# Patient Record
Sex: FEMALE | URBAN - METROPOLITAN AREA
[De-identification: names, ages, dates, MRNs, and addresses within clinical notes are randomized per-mention and may not be internally consistent; named-entity substitution may affect disease eponyms.]

---

## 2021-08-23 ENCOUNTER — VIRTUAL VISIT (OUTPATIENT)
Dept: INTERNAL MEDICINE | Facility: CLINIC | Age: 41
End: 2021-08-23
Payer: COMMERCIAL

## 2021-08-23 DIAGNOSIS — Z00.00 ENCOUNTER FOR PREVENTATIVE ADULT HEALTH CARE EXAMINATION: Primary | ICD-10-CM

## 2021-08-23 PROCEDURE — 99207 PR NO CHARGE LOS: CPT

## 2021-08-23 RX ORDER — PROPRANOLOL HCL 60 MG
60 CAPSULE, EXTENDED RELEASE 24HR ORAL
COMMUNITY
Start: 2019-09-27

## 2021-08-23 RX ORDER — SUMATRIPTAN 100 MG/1
100 TABLET, FILM COATED ORAL
COMMUNITY
Start: 2019-09-27

## 2021-08-23 NOTE — PROGRESS NOTES
"Health Maintenance:  Do you have a PCP? Yes  When was your last visit with your PCP? 1 year  When was your last eye exam? 5 months  Have you ever had a colonoscopy? No  If yes, when?   Have you ever had any polyps removed?     If Female: (25 years old+) When was your last pap smear ? 5/2017   Have you ever had abnormal pap smear results? Yes 9 years    (40 years old+) When was your last mammogram? 2020   If last mammogram was more than 1 year ago and patient would like to get  mammogram done as part of their  visit, ask the following questions:   1. Do you have any current or new breast problems? No   2. Where was your last mammogram done?  [facility]   3. Have your had any breast surgeries? No    As part of your visit we will set up a DEXA scan which will measure your body composition. We have a few questions that need to be answered before we can schedule this scan:   What is your approximate weight? 135   Have you ever had a DEXA scan within the past 2 years? No   Will you have any other imaging studies with contrast (x-ray, CT scan) within 7  days of this appointment? No   Have you had any spine or hip surgery? No   Do you take any vitamins that contain calcium or antacids with calcium? No    If yes, stop taking 24 hours prior to visit.     Goals for the Visit:  1. Thorough Comprehensive Preventive Exam  2. Ovarian Cancer in family hx  3. \"Gut Health\"  Pertinent past Medical/Family and Social HX:   Pertinent sx that desire are addressed with this visit:     Instructions prior to appointment:   1. Fast beginning at 10 pm for lab appointment  2. If your preventive care assessment package includes a Fitness Assessment, please bring athletic shoes. Complementary [facility] fitness attire is provided and yours to keep.  3. If eye exam, eyes may be dilated, it will last 4-6 hours, may want to bring sunglasses.   4. May bring laptop or other work materials for use during downtime.   5. You " will receive an email about 3 days prior to your visit with a final itinerary, menu selections for the complementary breakfast and lunch and instructions for the visit.     Complimentary  Parking provided. Drop off car in front of [facility], take the patient elevators to the [facility] clinic. When you enter in the lobby, identify yourself as an [facility] [atient and you will be escorted up to the clinic.   If questions arise prior to your appointment please contact the clinic at [phone].

## 2021-08-26 ENCOUNTER — APPOINTMENT (OUTPATIENT)
Dept: INTERNAL MEDICINE | Facility: CLINIC | Age: 41
End: 2021-08-26
Payer: COMMERCIAL

## 2021-08-26 ENCOUNTER — ANCILLARY PROCEDURE (OUTPATIENT)
Dept: MAMMOGRAPHY | Facility: CLINIC | Age: 41
End: 2021-08-26
Payer: COMMERCIAL

## 2021-08-26 ENCOUNTER — OFFICE VISIT (OUTPATIENT)
Dept: INTERNAL MEDICINE | Facility: CLINIC | Age: 41
End: 2021-08-26
Payer: COMMERCIAL

## 2021-08-26 ENCOUNTER — ANCILLARY PROCEDURE (OUTPATIENT)
Dept: BONE DENSITY | Facility: CLINIC | Age: 41
End: 2021-08-26
Payer: COMMERCIAL

## 2021-08-26 ENCOUNTER — OFFICE VISIT (OUTPATIENT)
Dept: DERMATOLOGY | Facility: CLINIC | Age: 41
End: 2021-08-26
Payer: COMMERCIAL

## 2021-08-26 ENCOUNTER — OFFICE VISIT (OUTPATIENT)
Dept: GASTROENTEROLOGY | Facility: CLINIC | Age: 41
End: 2021-08-26
Payer: COMMERCIAL

## 2021-08-26 ENCOUNTER — OFFICE VISIT (OUTPATIENT)
Dept: AUDIOLOGY | Facility: CLINIC | Age: 41
End: 2021-08-26
Payer: COMMERCIAL

## 2021-08-26 VITALS
SYSTOLIC BLOOD PRESSURE: 105 MMHG | HEIGHT: 66 IN | OXYGEN SATURATION: 99 % | RESPIRATION RATE: 16 BRPM | TEMPERATURE: 97.5 F | WEIGHT: 137 LBS | BODY MASS INDEX: 22.02 KG/M2 | HEART RATE: 58 BPM | DIASTOLIC BLOOD PRESSURE: 71 MMHG

## 2021-08-26 DIAGNOSIS — Z80.41 FAMILY HISTORY OF MALIGNANT NEOPLASM OF OVARY: Primary | ICD-10-CM

## 2021-08-26 DIAGNOSIS — Z12.83 SKIN CANCER SCREENING: ICD-10-CM

## 2021-08-26 DIAGNOSIS — Z00.00 ENCOUNTER FOR PREVENTATIVE ADULT HEALTH CARE EXAMINATION: ICD-10-CM

## 2021-08-26 DIAGNOSIS — D22.9 NEVUS SPILUS: ICD-10-CM

## 2021-08-26 DIAGNOSIS — D22.9 MULTIPLE MELANOCYTIC NEVI: Primary | ICD-10-CM

## 2021-08-26 DIAGNOSIS — Z12.31 VISIT FOR SCREENING MAMMOGRAM: ICD-10-CM

## 2021-08-26 DIAGNOSIS — Z71.82 EXERCISE COUNSELING: Primary | ICD-10-CM

## 2021-08-26 LAB
ALP SERPL-CCNC: 52 U/L (ref 40–150)
ALT SERPL W P-5'-P-CCNC: 21 U/L (ref 0–50)
BASOPHILS # BLD AUTO: 0 10E3/UL (ref 0–0.2)
BASOPHILS NFR BLD AUTO: 0 %
CHOLEST SERPL-MCNC: 193 MG/DL
CREAT SERPL-MCNC: 0.78 MG/DL (ref 0.52–1.04)
DEPRECATED CALCIDIOL+CALCIFEROL SERPL-MC: 27 UG/L (ref 20–75)
EOSINOPHIL # BLD AUTO: 0.1 10E3/UL (ref 0–0.7)
EOSINOPHIL NFR BLD AUTO: 2 %
ERYTHROCYTE [DISTWIDTH] IN BLOOD BY AUTOMATED COUNT: 12.9 % (ref 10–15)
EXPTIME-PRE: 6.49 SEC
FASTING STATUS PATIENT QL REPORTED: YES
FASTING STATUS PATIENT QL REPORTED: YES
FEF2575-%PRED-PRE: 105 %
FEF2575-PRE: 3.37 L/SEC
FEF2575-PRED: 3.19 L/SEC
FEFMAX-%PRED-PRE: 106 %
FEFMAX-PRE: 7.79 L/SEC
FEFMAX-PRED: 7.29 L/SEC
FEV1-%PRED-PRE: 94 %
FEV1-PRE: 2.85 L
FEV1FEV6-PRE: 85 %
FEV1FEV6-PRED: 84 %
FEV1FVC-PRE: 85 %
FEV1FVC-PRED: 83 %
FIFMAX-PRE: 3.99 L/SEC
FVC-%PRED-PRE: 92 %
FVC-PRE: 3.37 L
FVC-PRED: 3.65 L
GFR SERPL CREATININE-BSD FRML MDRD: >90 ML/MIN/1.73M2
GLUCOSE BLD-MCNC: 97 MG/DL (ref 70–99)
HCT VFR BLD AUTO: 39.1 % (ref 35–47)
HDLC SERPL-MCNC: 69 MG/DL
HGB BLD-MCNC: 12.9 G/DL (ref 11.7–15.7)
HIV 1+2 AB+HIV1 P24 AG SERPL QL IA: NONREACTIVE
HOLD SPECIMEN: NORMAL
IMM GRANULOCYTES # BLD: 0 10E3/UL
IMM GRANULOCYTES NFR BLD: 0 %
LDLC SERPL CALC-MCNC: 114 MG/DL
LYMPHOCYTES # BLD AUTO: 1.7 10E3/UL (ref 0.8–5.3)
LYMPHOCYTES NFR BLD AUTO: 25 %
MCH RBC QN AUTO: 29.5 PG (ref 26.5–33)
MCHC RBC AUTO-ENTMCNC: 33 G/DL (ref 31.5–36.5)
MCV RBC AUTO: 89 FL (ref 78–100)
MONOCYTES # BLD AUTO: 0.5 10E3/UL (ref 0–1.3)
MONOCYTES NFR BLD AUTO: 7 %
NEUTROPHILS # BLD AUTO: 4.4 10E3/UL (ref 1.6–8.3)
NEUTROPHILS NFR BLD AUTO: 66 %
NONHDLC SERPL-MCNC: 124 MG/DL
NRBC # BLD AUTO: 0 10E3/UL
NRBC BLD AUTO-RTO: 0 /100
PLATELET # BLD AUTO: 238 10E3/UL (ref 150–450)
RBC # BLD AUTO: 4.38 10E6/UL (ref 3.8–5.2)
TRIGL SERPL-MCNC: 51 MG/DL
TSH SERPL DL<=0.005 MIU/L-ACNC: 1.85 MU/L (ref 0.4–4)
WBC # BLD AUTO: 6.7 10E3/UL (ref 4–11)

## 2021-08-26 PROCEDURE — 84075 ASSAY ALKALINE PHOSPHATASE: CPT | Performed by: PATHOLOGY

## 2021-08-26 PROCEDURE — 93000 ELECTROCARDIOGRAM COMPLETE: CPT | Performed by: INTERNAL MEDICINE

## 2021-08-26 PROCEDURE — 87389 HIV-1 AG W/HIV-1&-2 AB AG IA: CPT | Performed by: INTERNAL MEDICINE

## 2021-08-26 PROCEDURE — 92550 TYMPANOMETRY & REFLEX THRESH: CPT | Performed by: AUDIOLOGIST

## 2021-08-26 PROCEDURE — 84443 ASSAY THYROID STIM HORMONE: CPT | Performed by: PATHOLOGY

## 2021-08-26 PROCEDURE — 92565 STENGER TEST PURE TONE: CPT | Performed by: AUDIOLOGIST

## 2021-08-26 PROCEDURE — 85025 COMPLETE CBC W/AUTO DIFF WBC: CPT | Performed by: PATHOLOGY

## 2021-08-26 PROCEDURE — 99385 PREV VISIT NEW AGE 18-39: CPT | Performed by: INTERNAL MEDICINE

## 2021-08-26 PROCEDURE — 99203 OFFICE O/P NEW LOW 30 MIN: CPT | Performed by: DERMATOLOGY

## 2021-08-26 PROCEDURE — 84460 ALANINE AMINO (ALT) (SGPT): CPT | Performed by: PATHOLOGY

## 2021-08-26 PROCEDURE — 77080 DXA BONE DENSITY AXIAL: CPT | Performed by: INTERNAL MEDICINE

## 2021-08-26 PROCEDURE — 82565 ASSAY OF CREATININE: CPT | Performed by: PATHOLOGY

## 2021-08-26 PROCEDURE — 77067 SCR MAMMO BI INCL CAD: CPT | Mod: GC | Performed by: RADIOLOGY

## 2021-08-26 PROCEDURE — 99207 PR NO CHARGE LOS: CPT

## 2021-08-26 PROCEDURE — 99207 PR NO BILLABLE SERVICE THIS VISIT: CPT | Performed by: DIETITIAN, REGISTERED

## 2021-08-26 PROCEDURE — 80061 LIPID PANEL: CPT | Performed by: PATHOLOGY

## 2021-08-26 PROCEDURE — 36415 COLL VENOUS BLD VENIPUNCTURE: CPT | Performed by: PATHOLOGY

## 2021-08-26 PROCEDURE — 92557 COMPREHENSIVE HEARING TEST: CPT | Performed by: AUDIOLOGIST

## 2021-08-26 PROCEDURE — 82947 ASSAY GLUCOSE BLOOD QUANT: CPT | Performed by: PATHOLOGY

## 2021-08-26 PROCEDURE — 94375 RESPIRATORY FLOW VOLUME LOOP: CPT | Performed by: INTERNAL MEDICINE

## 2021-08-26 PROCEDURE — 77063 BREAST TOMOSYNTHESIS BI: CPT | Mod: GC | Performed by: RADIOLOGY

## 2021-08-26 PROCEDURE — 82306 VITAMIN D 25 HYDROXY: CPT | Performed by: INTERNAL MEDICINE

## 2021-08-26 ASSESSMENT — MIFFLIN-ST. JEOR: SCORE: 1313.18

## 2021-08-26 ASSESSMENT — PAIN SCALES - GENERAL
PAINLEVEL: NO PAIN (0)
PAINLEVEL: NO PAIN (0)

## 2021-08-26 NOTE — LETTER
Date:September 21, 2021      Patient was self referred, no letter generated. Do not send.        [facility] Health Information

## 2021-08-26 NOTE — NURSING NOTE
Dermatology Rooming Note    Bernadine Inman's goals for this visit include:   Chief Complaint   Patient presents with     Derm Problem     bernadine is coming in today for a skin check, states that she has a mole on the stomach that she would like looked at, also states she has a spot on the back that she has been watching     Flaca Villalpando CMA on 8/26/2021 at 1:12 PM

## 2021-08-26 NOTE — PROGRESS NOTES
AUDIOLOGY REPORT    SUMMARY: Audiology visit completed as part of the [facility] Health Assessment. See audiogram for results.      RECOMMENDATIONS: Follow-up with ENT if concerned for episodic left ear symptoms or asymmetry in hearing 4000 Hz (left ear poorer).      Madi Lowry.  Licensed Audiologist  [place] #[id number]

## 2021-08-26 NOTE — LETTER
"Dear Bernadine,    It was a pleasure to meet you in the [facility] program earlier this month!  I am writing to inform you of your test results and recommendations and to provide paper copies for your records.  Most results were available at the time of your office visit, however, I will still summarize everything here.    The two blood test results of note were a borderline high normal fasting glucose, and a low end of normal range Vitamin D level.  A low carb diet, something similar to the Mediterranean Diet, plus increased exercise will help improve glucose levels.  In terms of Vitamin D, I would take a supplement regularly; the standard recommendation is 2,000 international unit(s) daily of D3.    All other labs, including thyroid function, kidney function, electrolytes, and complete blood counts were normal.  This is great news!  And while the LDL cholesterol is flagged as high, the high HDL or \"good\" cholesterol balances this out.  I would continue to pay close attention to diet and exercise and follow it over time.    Your bone density test (DEXA) was in the normal range. Your mammogram was read out as negative.  The pulmonary function tests and cardiogram (EKG) were also normal, indicating good heart and lung function.    Overall, I think you are enjoying excellent health and we should take steps to keep it that way!  I would welcome a return visit in one year to repeat these tests and track things over time.  But please, do not hesitate to reach out to me with any questions or concerns.    Enjoy the rest of your summer!    Sincerely,        Dr. Noa Gardner  General Internal Medicine                        Results for orders placed or performed in visit on 08/26/21   HIV Antigen Antibody Combo [[id number]]     Status: Normal   Result Value Ref Range    HIV Antigen Antibody Combo Nonreactive Nonreactive   Lipid panel reflex to direct LDL Fasting     Status: Abnormal   Result Value Ref Range    " Cholesterol 193 <200 mg/dL    Triglycerides 51 <150 mg/dL    Direct Measure HDL 69 >=50 mg/dL    LDL Cholesterol Calculated 114 (H) <=100 mg/dL    Non HDL Cholesterol 124 <130 mg/dL    Patient Fasting > 8hrs? Yes    Vitamin D Deficiency     Status: Normal   Result Value Ref Range    Vitamin D, Total (25-Hydroxy) 27 20 - 75 ug/L    Narrative    Season, race, dietary intake, and treatment affect the concentration of 25-hydroxy-Vitamin D. Values may decrease during winter months and increase during summer months. Values 20-29 ug/L may indicate Vitamin D insufficiency and values <20 ug/L may indicate Vitamin D deficiency.    Vitamin D determination is routinely performed by an immunoassay specific for 25 hydroxyvitamin D3.  If an individual is on vitamin D2(ergocalciferol) supplementation, please specify 25 OH vitamin D2 and D3 level determination by LCMSMS test VITD23.     TSH     Status: Normal   Result Value Ref Range    TSH 1.85 0.40 - 4.00 mU/L   Glucose     Status: None   Result Value Ref Range    Glucose 97 70 - 99 mg/dL    Patient Fasting > 8hrs? Yes    Creatinine     Status: Normal   Result Value Ref Range    Creatinine 0.78 0.52 - 1.04 mg/dL    GFR Estimate >90 >60 mL/min/1.73m2   Alkaline phosphatase     Status: Normal   Result Value Ref Range    Alkaline Phosphatase 52 40 - 150 U/L   ALT     Status: Normal   Result Value Ref Range    ALT 21 0 - 50 U/L   CBC with platelets differential     Status: None    Narrative    The following orders were created for panel order CBC with platelets differential.  Procedure                               Abnormality         Status                     ---------                               -----------         ------                     CBC with platelets and d...[[id number]]                      Final result                 Please view results for these tests on the individual orders.   CBC with platelets and differential     Status: None   Result Value Ref Range    WBC  Count 6.7 4.0 - 11.0 10e3/uL    RBC Count 4.38 3.80 - 5.20 10e6/uL    Hemoglobin 12.9 11.7 - 15.7 g/dL    Hematocrit 39.1 35.0 - 47.0 %    MCV 89 78 - 100 fL    MCH 29.5 26.5 - 33.0 pg    MCHC 33.0 31.5 - 36.5 g/dL    RDW 12.9 10.0 - 15.0 %    Platelet Count 238 150 - 450 10e3/uL    % Neutrophils 66 %    % Lymphocytes 25 %    % Monocytes 7 %    % Eosinophils 2 %    % Basophils 0 %    % Immature Granulocytes 0 %    NRBCs per 100 WBC 0 <1 /100    Absolute Neutrophils 4.4 1.6 - 8.3 10e3/uL    Absolute Lymphocytes 1.7 0.8 - 5.3 10e3/uL    Absolute Monocytes 0.5 0.0 - 1.3 10e3/uL    Absolute Eosinophils 0.1 0.0 - 0.7 10e3/uL    Absolute Basophils 0.0 0.0 - 0.2 10e3/uL    Absolute Immature Granulocytes 0.0 <=0.0 10e3/uL    Absolute NRBCs 0.0 10e3/uL   Extra Blue Top Tube     Status: None   Result Value Ref Range    Hold Specimen JIC    Extra Tube     Status: None    Narrative    The following orders were created for panel order Extra Tube.  Procedure                               Abnormality         Status                     ---------                               -----------         ------                     Extra Blue Top Tube[[id number]]                              Final result                 Please view results for these tests on the individual orders.   Results for orders placed or performed in visit on 08/26/21   General PFT Lab (Please always keep checked)     Status: None   Result Value Ref Range    FVC-Pred 3.65 L    FVC-Pre 3.37 L    FVC-%Pred-Pre 92 %    FEV1-Pre 2.85 L    FEV1-%Pred-Pre 94 %    FEV1FVC-Pred 83 %    FEV1FVC-Pre 85 %    FEFMax-Pred 7.29 L/sec    FEFMax-Pre 7.79 L/sec    FEFMax-%Pred-Pre 106 %    FEF2575-Pred 3.19 L/sec    FEF2575-Pre 3.37 L/sec    RJG8473-%Pred-Pre 105 %    ExpTime-Pre 6.49 sec    FIFMax-Pre 3.99 L/sec    FEV1FEV6-Pred 84 %    FEV1FEV6-Pre 85 %    Narrative    The FVC, FEV1, FEV1/FVC ratio and SIJ44-16% are within normal limits.  The inspiratory flow rates are reduced but the  expiratory flow rates are within normal limits.        IMPRESSION:    Normal Spirometry.              This interpretation has been electronically signed:  JUSTIN CHAUDHRY 08/26/2021  04:46:36 PM     Results for orders placed or performed in visit on 08/26/21   MA Screen Bilateral w/Chuck     Status: None    Narrative    BILATERAL FULL FIELD DIGITAL SCREENING MAMMOGRAM WITH TOMOSYNTHESIS    Performed on: 8/26/21    Compared to: 09/30/2020, 08/19/2019, and 04/05/2018    Technique:  This study was evaluated with the assistance of Computer-Aided   Detection.  Breast Tomosynthesis was used in interpretation.    Findings: The breasts are heterogeneously dense, which may obscure small   masses.  There is no radiographic evidence of malignancy.     IMPRESSION: ACR BI-RADS Category 1: Negative    RECOMMENDED FOLLOW-UP: Annual routine screening mammogram    The results and recommendations of this examination will be communicated   to the patient.   Results for orders placed or performed in visit on 08/26/21   DX Hip/Pelvis/Spine     Status: None    Narrative    [address] [address]  Phone: [phone]   Fax: [phone]      Patient name:   Bernadine Inman  Patient demographics:  40 year old Female   History:  Evaluation of Bone Density, History of Fracture and Tobacco   Habit - Current, [facility]  Current treatments:  No Medications  Scan:    Forever      Conclusions:  The most negative and valid Z-score of -1.1 at the level of the lumbar   spine is WITHIN normal range according to WHO criteria for premenopausal   females and men age less than 50.    Low bone density is not the only risk factor for fracture; consider   factors such as patient's age, fall risk, injury risk, previous   osteoporotic fracture, family history of osteoporosis, etc.  People with   an elevated risk of fracture should be regularly evaluated for  low bone   mineral density.  For patients eligible for Medicare, routine testing is   allowed once every 2 years. Testing frequency can be increased for   patients on corticosteroids. Clinical correlation is recommended.     Please refer to BMD values in PACS.    Bone densitometry cannot rule out secondary causes of bone loss.   Therefore, further metabolic testing to look for secondary causes of low   BMD should be performed if indicated. Clinical correlation is recommended      Feel free to contact DXA services if you have any questions or comments.    Thank you for the opportunity to be of service to you and your patient.    Principal result :  Cindy Mays MD, [facility]  Division of Endocrinology and Diabetes    Department of Medicine    Technical comments:   Satisfactory.     References:  Ref. 1. WHO categories:          T-score > -1.0  = normal             .                                T-score -1.0 to -2.5 = low bone density  T-score < -2.5 = osteoporosis        .     Ref. 2. 2015 ISCD official position statements:  www.iscd.org.     Ref. 3.  (LSC = least significant change)  AP spine =  0.032 g/cm2  (6.26.2007)  Left hip = 0.029 g/cm2  (6.15.2007)  Right hip = 0.018 g/cm2  (6.15.2007)  Left mid radius = 0.043 g/cm2  (6.26.2007)  Lateral spine region = pending  Total body = pending  According to the ISCD position statements, total hip rather than femoral   neck regions are to be compared because larger areas give better   precision.     Ref. 4.  By definition, osteoporosis may be diagnosed in the presence or   with the history of a low trauma or fragility fracture.  Fragility and low   trauma fracture is defined as a fracture resulting from the force of a   fall from a standing height or less or a bone that breaks under conditions   that would not cause a normal bone to break.       Ref. 5. NOF Physician's Guideline Website address:  www.nof.org.   Results for orders placed or performed in  visit on 08/26/21   EKG 12-lead, tracing only     Status: None   Result Value Ref Range    Systolic Blood Pressure  mmHg    Diastolic Blood Pressure  mmHg    Ventricular Rate 56 BPM    Atrial Rate 56 BPM    MD Interval 152 ms    QRS Duration 82 ms     ms    QTc 397 ms    P Axis -5 degrees    R AXIS 32 degrees    T Axis 43 degrees    Interpretation ECG       Sinus bradycardia with sinus arrhythmia  Otherwise normal ECG  No previous ECGs available  Confirmed by Sarah Jauregui ([id number]) on 8/28/2021 3:58:03 PM

## 2021-08-26 NOTE — LETTER
8/26/2021       RE: Bernadine Inman  [address]  [place] [facility] [address]     Dear Colleague,    Thank you for referring your patient, Bernadine Inman, to the [facility]ER[facility] [place] at [facility]. Please see a copy of my visit note below.    CHIEF COMPLAINT:  [facility] skin cancer screening exam.    HISTORY OF PRESENT ILLNESS:  Bernadine is a very pleasant 40-year-old female who is a new patient to our clinic today, presenting as part of a [facility] preventive visit here for a skin cancer screening exam.  Previously, she has seen Dr. Blanquita Meeks at [facility] and had 1 biopsy of a pigmented lesion, which was reported as benign.  Otherwise, Dr. Meeks has been following several moles over time, but she has not had any additional biopsies.      Today, Bernadine reports that all her moles appear stable and she denies any localized areas of itch, pain or bleeding.  She reports that several family members have had what she believes were nonmelanoma skin cancers, but she has no family history of melanoma.    REVIEW OF SYSTEMS:  No recent fevers.  No nonhealing oral sores.    PHYSICAL EXAMINATION:    GENERAL:  This is a well-appearing, well-nourished female with normal mood and affect, who is oriented x3.  SKIN:  A cutaneous exam of the head, neck, chest, abdomen, back, bilateral upper and lower extremities and buttocks was performed in the presence of a female chaperone.  Scattered on the back, there are 2- to 4 mm light tan to brown macules and flat-topped papules, all of which demonstrate regular features on gross exam and dermoscopy.  On the midline upper back, there is an approximately 3 cm light tan speckled patch with a hypopigmented halo which most resembles a nevus spilus.  On the abdomen, there are a few 2- to 3 mm light tan, well-marginated papules consistent with benign nevi.  On the dorsal forearms,  face and neck, there is evidence of mild chronic photodamage.  The rest of her cutaneous exam is unremarkable.    ASSESSMENT AND PLAN:    1.  Benign skin cancer screening exam as part of a [facility] visit.  Bernadine was reassured today about her overall benign findings on exam.  We had an extended discussion about the ABCDEs of melanoma as well as the signs and symptoms of nonmelanoma skin cancers.  We also discussed our recommendations regarding sun protective behaviors including high SPF sunscreens and sun protective clothing.  2.  Multiple benign appearing nevi of the trunk and mid-back.  Reassurance was provided as to the benign nature of these lesions.  3.  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  Reassurance was similarly provided.  4.  She will follow up in our clinic on an as-needed basis.      Jean Tavarez MD  Dermatology Attending            Again, thank you for allowing me to participate in the care of your patient.      Sincerely,    Jean Tavarez MD

## 2021-08-26 NOTE — LETTER
"8/26/2021      RE: Bernadine Inman  [address]  [facility] [address]       Bernadine La is a very pleasant 40 year old female who was seen today for annual physical and executive health assessment.      She is overall enjoying good health, but does endorse a couple of concerns.  First, she has a strong family history of cancer, including breast and ovarian cancer; we discussed a referral [facility] for a repeat [facility].  She had genetic counseling at [facility] 6 years ago, however, more family history is evident since then and I am fairly certain more extensive gene panels are available now compared to then.    We spent some time discussing migraines, which are well controlled on propranolol, occurring maybe 1-2 times a year.  She's had intermittent knee pain which responds to physical therapy.  We also discussed gut health, and a long history of what sounds like possible irritable bowel syndrome, diarrhea predominant. She knows how to manage with dietary changes, however I mentioned if her symptoms become more bothersome, we could consider a dietitian referral or a consult with Dr. Milady Baez in Gastroenterology.    Her health goals for the near future include eating a more plant based diet and getting more consistent exercise.  She enjoys working from home but feels overall less active.  She lives in [place] working as an .    Otherwise, no changes to past, family or social history per intake note dated 8/23 and  ROS: 10 point ROS neg other than the symptoms noted above in the HPI.    PHYSICAL EXAM:  /71   Pulse 58   Temp 97.5  F (36.4  C) (Oral)   Resp 16   Ht 1.676 m (5' 6\")   Wt 62.1 kg (137 lb)   LMP 07/24/2021   SpO2 99%   BMI 22.11 kg/m    Constitutional: no distress, comfortable, pleasant   Eyes: anicteric, normal extra-ocular movements   Ears, Nose and Throat: neck supple with full range of motion, no thyromegaly. "   Cardiovascular: regular rate and rhythm, normal S1 and S2, no murmurs, rubs or gallops, peripheral pulses full and symmetric   Respiratory: clear to auscultation, no wheezes or crackles, normal breath sounds   Gastrointestinal: positive bowel sounds, nontender, no hepatosplenomegaly, no masses   Musculoskeletal: knees full range of motion, no effusion or joint space narrowing  Skin: no concerning lesions, no jaundice   Neurological: normal gait, no tremor   Psychological: appropriate mood   Lymphatic: no cervical lymphadenopathy and no pedal edema        A/P  40 year old here for PE and executive health assessment, doing well.  Please see this encounter's lab result letter for a more detailed explanation of test results and recommendations.  Plan to refer to genetic counseling for cancer risk given family history.    Family history of malignant neoplasm of ovary  -     Cancer Risk Mgmt/Cancer Genetic Counseling Referral; Future    Encounter for preventative adult health care examination  -     HIV Antigen Antibody Combo [[id number]]  -     Lipid panel reflex to direct LDL Fasting  -     Vitamin D Deficiency  -     TSH  -     Glucose  -     Creatinine  -     Alkaline phosphatase  -     ALT  -     CBC with platelets differential    Other orders  -     Extra Tube    RTC in one year, or sooner prn    MD Kendra Anaya MD

## 2021-08-26 NOTE — PATIENT INSTRUCTIONS
It was nice meeting you today. Below are the nutrition recommendations we discussed at your visit. If you wanted to have a follow up visit outside of your annual executive health visits to follow up on the low FODMAP diet/re-introduction/personalizing phase, I included my gastroenterology scheduling number below.  I'm currently doing video visits through that clinic.    Please let me know if you have any additional questions.    Nutrition Recommendations    1. Can follow a full low FODMAP diet or modified/partial low FODMAP diet for 3-4 weeks.  - Start with eliminating higher FODMAP foods for 3-4 weeks.   -Then after 3-4 weeks start adding those higher FODMAP foods back into diet. (see below).    OR   If you prefer to do a partial/modified lower FODMAP diet, then    -For Modified/partial lower FODMAP diet, review the list of high FODMAP foods and then eliminate several of those higher FODMAP foods that you eat most days of the week for 3-4 weeks.    After 3-4 weeks, to reintroduce and add higher FODMAP foods back into diet:    -Start by adding back in 1 of your favorite higher FODMAP foods by eating a small serving of that food each day for 3-4 days OR you can gradually increase the portion size over the course of the 3-4 days. For example on day 1 have 1/4 cup serving, day 2, 1/3 c and then on day 3 can have a 1/2 cup serving.    -If tolerated, then add another higher FODMAP food back into diet for 3-4 days and onward.     -You can add in a food every 3-4 days or could add one food each week (depending on how quickly you'd like to re-introduce and also depending on if you have a return of symptoms that may linger for a day or so).    -If you notice any symptoms after adding back in a higher FODMAP food than can wait a couple of days before trying the next higher FODMAP food.     -Also if you notice a significant increase in symptoms after retrying the first day, you do not need to eat on additional days    2. Keep  daily food and beverage journals and track all symptoms you experience. Can use the FODMAP sander on [facility]'s site or other apps include My Symptoms Tracker or [employer] sander.    3. Plan menus and meals ahead of time to help you stick to the elimination diet.    4. Continue following a more plant-based diet if this is better tolerated.    5. Consistently drink at least 60 oz fluids or more such as water, herbal tea per day.    If you would like to schedule a follow up appointment, please call [phone].    Alize Hoyt, MS, RD, LD

## 2021-08-26 NOTE — NURSING NOTE
Chief Complaint   Patient presents with     Physical     Patient is here for annual physical     Katheryn Perez CMA 6:55 AM on 8/26/2021.

## 2021-08-26 NOTE — LETTER
Date:November 19, 2021      Patient was self referred, no letter generated. Do not send.        [facility] Health Information

## 2021-08-26 NOTE — PROGRESS NOTES
"[facility] Outpatient Medical Nutrition Therapy      Time Spent:  60 minutes  Session Type:  Initial  Referral Source: [facility] [facility] Package/Dr. Noa Gardner  Reason for RD Visit:   Nutritional counseling     Nutrition Assessment:  Patient is here for initial annual visit with Registered Dietitian (LUIS MIGUEL).  Patient is a 40 y.o. female. She stated that she follows an intermittent diet and also more of a plant based diet. She very occasionally has some fish. She eats eggs but not daily and will have some cheese occasionally as well. She stated that she has a long history of having issues with diarrhea/looser stools which is why she switched to trying intermittent fasting and more plant-based diet. She doesn't drinking any caffeine. She will have some alcohol on the weekends. Before making some dietary changes, she noticed that alcohol may have been a trigger for looser stools but improved with dietary changes. She has had a busy month, so feels that she has had some more symptoms this month but previously had some improvement and less looser stools before this month with dietary changes.      At discussion today, she may like to try a modified low FODMAP diet, so provided instructions today.    Patient Active Problem List   Diagnosis     Left hip pain     Cervicalgia     Chondromalacia of patella, right     Pain in thoracic spine     Lumbago     Bilateral hip pain     Disorder of bursae and tendons in shoulder region       Estimated body mass index is 22.11 kg/m  as calculated from the following:    Height as of an earlier encounter on 8/26/21: 1.676 m (5' 6\").    Weight as of an earlier encounter on 8/26/21: 62.1 kg (137 lb).    Diet Recall:  (some usual/recent meals/snacks/beverages)  Meal Food    Breakfast Skips d/t intermittent fasting   Lunch Tofu, veg wrap or salad or homemade smoothie with plant based pro, greens, banana, frozen berries   Dinner Pasta and broccoli with marinara sauce or smoked " fish with rayo and lon slaw with vegan sauce    Snacks 3pm: tortilla chips. Sometimes a snack after dinner if still hungry: popcorn, smoothie   Beverages 40-60 oz water, 1-2 c herbal teas, 1 can sparkling water per day   Alcohol Intake On 1-2 weekend nights, 3-4 drinks each night when having alcohol such as glasses wine, sometimes beer, very occas if out cocktail      Labs:  On 8/26/21: . Others reviewed in EMR.  Pertinent Medications/vitamin and mineral supplements:     Current Outpatient Medications   Medication     propranolol ER (INDERAL LA) 60 MG 24 hr capsule     SUMAtriptan (IMITREX) 100 MG tablet     No current facility-administered medications for this visit.     Food Allergies:  NKFA    Nutrition Diagnosis:    Food and nutrition related knowledge deficit related to lack of previous diet education/lack of complete recall of previous diet education as evidenced by pt report and interest in diet education/review.     Nutrition Prescription: trial modified low FODMAP diet x 3-4 week, continue more plant based diet    Nutrition Intervention:    Nutrition Education/Counseling:  -Provided general healthful diet nutrition education with tips and suggestions. Discussed general healthful diet using plate method. Discussed some potential foods and beverage contributing to diarrhea/loose stools.   Nutrition Education: Provided nutrition education on general digestion of carbohydrates, FODMAP (Fermentable Oligo-saccharides, Di-saccharides, Mono-saccharides And Polyols) foods, impact these short chain carbohydrates on GI tract, Gut microbiota and its effects on fiber. Discussed high and low FODMAP foods. Reviewed FODMAP diet guidelines, including length of the diet,  the different phases of the diet plan with elimination phase and discussed recommendation and schedule for adding foods back in. Explained that pt could follow a modified lower FODMAP diet if preferred. For Modified lower FODMAP diet, review the  list of high FODMAP foods and then eliminate 1-2 of those high FODMAP foods you eat daily/most days to see if any improvement in GI issues. (i.e. onions).    Keep daily food and beverage journals and track all GI symptoms.    Discussed adequate hydration and recommended pt continue drinking at least 48 oz-64 oz fluids/water per day. Provided diet education handouts for Low FODMAP. Patient expressed understanding of diet education and interested in focusing on highlighted goals below.    Answered patient's questions. Patient verbalized understanding of education provided. Provided pt with RD contact information and list of goals below.     Educational Materials Provided:  [employer] Daily Nutrition Plate method handout and Nutrition Care Manual: Low FODMAP nutrition therapy and FODMAP food chart     Goals:  1. Can follow a full low FODMAP diet or modified/partial low FODMAP diet for 3-4 weeks.  - Start with eliminating higher FODMAP foods for 3-4 weeks.   -Then after 3-4 weeks start adding those higher FODMAP foods back into diet. (see below).    OR   If you prefer to do a partial/modified lower FODMAP diet, then    -For Modified/partial lower FODMAP diet, review the list of high FODMAP foods and then eliminate several of those higher FODMAP foods that you eat most days of the week for 3-4 weeks.    After 3-4 weeks, to reintroduce and add higher FODMAP foods back into diet:    -Start by adding back in 1 of your favorite higher FODMAP foods by eating a small serving of that food each day for 3-4 days OR you can gradually increase the portion size over the course of the 3-4 days. For example on day 1 have 1/4 cup serving, day 2, 1/3 c and then on day 3 can have a 1/2 cup serving.    -If tolerated, then add another higher FODMAP food back into diet for 3-4 days and onward.     -You can add in a food every 3-4 days or could add one food each week (depending on how quickly you'd like to re-introduce and also depending on if  you have a return of symptoms that may linger for a day or so).    -If you notice any symptoms after adding back in a higher FODMAP food than can wait a couple of days before trying the next higher FODMAP food.     -Also if you notice a significant increase in symptoms after retrying the first day, you do not need to eat on additional days    2. Keep daily food and beverage journals and track all symptoms you experience. Can use the FODMAP sander on [facility]'s site or other apps include My Symptoms Tracker or [employer] sander.    3. Plan menus and meals ahead of time to help you stick to the elimination diet.    4. Continue following a more plant-based diet if this is better tolerated.    5. Consistently drink at least 60 oz fluids or more such as water, herbal tea per day.    Nutrition Monitoring and Evaluation: Will monitor adherence to nutrition recommendations at future RD visits.     Further Medical Nutrition Therapy:  Annual visits  Patient was encouraged to call/contact RD with any further questions.    Alize Hoyt, MS, RD, LD

## 2021-08-26 NOTE — LETTER
"8/26/2021       RE: Bernadine Inman  [address]  [place] [address]     Dear Colleague,    Thank you for referring your patient, Bernadine Inman, to the [facility] [place] [facility]. Please see a copy of my visit note below.    Bernadine La is a very pleasant 40 year old female who was seen today for annual physical and executive health assessment.      She is overall enjoying good health, but does endorse a couple of concerns.  First, she has a strong family history of cancer, including breast and ovarian cancer; we discussed a referral to our genetic counselors here at the [facility] for a repeat assessment.  She had genetic counseling at [facility] 6 years ago, however, more family history is evident since then and I am fairly certain more extensive gene panels are available now compared to then.    We spent some time discussing migraines, which are well controlled on propranolol, occurring maybe 1-2 times a year.  She's had intermittent knee pain which responds to physical therapy.  We also discussed gut health, and a long history of what sounds like possible irritable bowel syndrome, diarrhea predominant. She knows how to manage with dietary changes, however I mentioned if her symptoms become more bothersome, we could consider a dietitian referral or a consult with Dr. Milady Baez in Gastroenterology.    Her health goals for the near future include eating a more plant based diet and getting more consistent exercise.  She enjoys working from home but feels overall less active.  She lives in [place] working as an .    Otherwise, no changes to past, family or social history per intake note dated 8/23 and  ROS: 10 point ROS neg other than the symptoms noted above in the HPI.    PHYSICAL EXAM:  /71   Pulse 58   Temp 97.5  F (36.4  C) (Oral)   Resp 16   Ht 1.676 m (5' 6\")   Wt 62.1 " kg (137 lb)   LMP 07/24/2021   SpO2 99%   BMI 22.11 kg/m    Constitutional: no distress, comfortable, pleasant   Eyes: anicteric, normal extra-ocular movements   Ears, Nose and Throat: neck supple with full range of motion, no thyromegaly.   Cardiovascular: regular rate and rhythm, normal S1 and S2, no murmurs, rubs or gallops, peripheral pulses full and symmetric   Respiratory: clear to auscultation, no wheezes or crackles, normal breath sounds   Gastrointestinal: positive bowel sounds, nontender, no hepatosplenomegaly, no masses   Musculoskeletal: knees full range of motion, no effusion or joint space narrowing  Skin: no concerning lesions, no jaundice   Neurological: normal gait, no tremor   Psychological: appropriate mood   Lymphatic: no cervical lymphadenopathy and no pedal edema        A/P  40 year old here for PE and executive health assessment, doing well.  Please see this encounter's lab result letter for a more detailed explanation of test results and recommendations.  Plan to refer to genetic counseling for cancer risk given family history.    Family history of malignant neoplasm of ovary  -     Cancer Risk Mgmt/Cancer Genetic Counseling Referral; Future    Encounter for preventative adult health care examination  -     HIV Antigen Antibody Combo [[id number]]  -     Lipid panel reflex to direct LDL Fasting  -     Vitamin D Deficiency  -     TSH  -     Glucose  -     Creatinine  -     Alkaline phosphatase  -     ALT  -     CBC with platelets differential    Other orders  -     Extra Tube    RTC in one year, or sooner prn    Noa Gardner MD          Again, thank you for allowing me to participate in the care of your patient.      Sincerely,    Kendra Turner MD

## 2021-08-26 NOTE — LETTER
"    8/26/2021         RE: Bernadine Inman  [address]  [place] [facility] [address]        Dear Colleague,    Thank you for referring your patient, Bernadine Inman, to the [facility] [place]. Please see a copy of my visit note below.    [facility] Outpatient Medical Nutrition Therapy      Time Spent:  60 minutes  Session Type:  Initial  Referral Source: [employer] Package/Dr. Noa Gardner  Reason for RD Visit:   Nutritional counseling     Nutrition Assessment:  Patient is here for initial annual visit with Registered Dietitian (LUIS MIGUEL).  Patient is a 40 y.o. female. She stated that she follows an intermittent diet and also more of a plant based diet. She very occasionally has some fish. She eats eggs but not daily and will have some cheese occasionally as well. She stated that she has a long history of having issues with diarrhea/looser stools which is why she switched to trying intermittent fasting and more plant-based diet. She doesn't drinking any caffeine. She will have some alcohol on the weekends. Before making some dietary changes, she noticed that alcohol may have been a trigger for looser stools but improved with dietary changes. She has had a busy month, so feels that she has had some more symptoms this month but previously had some improvement and less looser stools before this month with dietary changes.      At discussion today, she may like to try a modified low FODMAP diet, so provided instructions today.    Patient Active Problem List   Diagnosis     Left hip pain     Cervicalgia     Chondromalacia of patella, right     Pain in thoracic spine     Lumbago     Bilateral hip pain     Disorder of bursae and tendons in shoulder region       Estimated body mass index is 22.11 kg/m  as calculated from the following:    Height as of an earlier encounter on 8/26/21: 1.676 m (5' 6\").    Weight as of an earlier encounter on 8/26/21: 62.1 kg " (137 lb).    Diet Recall:  (some usual/recent meals/snacks/beverages)  Meal Food    Breakfast Skips d/t intermittent fasting   Lunch Tofu, veg wrap or salad or homemade smoothie with plant based pro, greens, banana, frozen berries   Dinner Pasta and broccoli with marinara sauce or smoked fish with rayo and lon slaw with vegan sauce    Snacks 3pm: tortilla chips. Sometimes a snack after dinner if still hungry: popcorn, smoothie   Beverages 40-60 oz water, 1-2 c herbal teas, 1 can sparkling water per day   Alcohol Intake On 1-2 weekend nights, 3-4 drinks each night when having alcohol such as glasses wine, sometimes beer, very occas if out cocktail      Labs:  On 8/26/21: . Others reviewed in EMR.  Pertinent Medications/vitamin and mineral supplements:     Current Outpatient Medications   Medication     propranolol ER (INDERAL LA) 60 MG 24 hr capsule     SUMAtriptan (IMITREX) 100 MG tablet     No current facility-administered medications for this visit.     Food Allergies:  NKFA    Nutrition Diagnosis:    Food and nutrition related knowledge deficit related to lack of previous diet education/lack of complete recall of previous diet education as evidenced by pt report and interest in diet education/review.     Nutrition Prescription: trial modified low FODMAP diet x 3-4 week, continue more plant based diet    Nutrition Intervention:    Nutrition Education/Counseling:  -Provided general healthful diet nutrition education with tips and suggestions. Discussed general healthful diet using plate method. Discussed some potential foods and beverage contributing to diarrhea/loose stools.   Nutrition Education: Provided nutrition education on general digestion of carbohydrates, FODMAP (Fermentable Oligo-saccharides, Di-saccharides, Mono-saccharides And Polyols) foods, impact these short chain carbohydrates on GI tract, Gut microbiota and its effects on fiber. Discussed high and low FODMAP foods. Reviewed FODMAP diet  guidelines, including length of the diet,  the different phases of the diet plan with elimination phase and discussed recommendation and schedule for adding foods back in. Explained that pt could follow a modified lower FODMAP diet if preferred. For Modified lower FODMAP diet, review the list of high FODMAP foods and then eliminate 1-2 of those high FODMAP foods you eat daily/most days to see if any improvement in GI issues. (i.e. onions).    Keep daily food and beverage journals and track all GI symptoms.    Discussed adequate hydration and recommended pt continue drinking at least 48 oz-64 oz fluids/water per day. Provided diet education handouts for Low FODMAP. Patient expressed understanding of diet education and interested in focusing on highlighted goals below.    Answered patient's questions. Patient verbalized understanding of education provided. Provided pt with RD contact information and list of goals below.     Educational Materials Provided:  [employer] Daily Nutrition Plate method handout and Nutrition Care Manual: Low FODMAP nutrition therapy and FODMAP food chart     Goals:  1. Can follow a full low FODMAP diet or modified/partial low FODMAP diet for 3-4 weeks.  - Start with eliminating higher FODMAP foods for 3-4 weeks.   -Then after 3-4 weeks start adding those higher FODMAP foods back into diet. (see below).    OR   If you prefer to do a partial/modified lower FODMAP diet, then    -For Modified/partial lower FODMAP diet, review the list of high FODMAP foods and then eliminate several of those higher FODMAP foods that you eat most days of the week for 3-4 weeks.    After 3-4 weeks, to reintroduce and add higher FODMAP foods back into diet:    -Start by adding back in 1 of your favorite higher FODMAP foods by eating a small serving of that food each day for 3-4 days OR you can gradually increase the portion size over the course of the 3-4 days. For example on day 1 have 1/4 cup serving, day 2, 1/3 c  and then on day 3 can have a 1/2 cup serving.    -If tolerated, then add another higher FODMAP food back into diet for 3-4 days and onward.     -You can add in a food every 3-4 days or could add one food each week (depending on how quickly you'd like to re-introduce and also depending on if you have a return of symptoms that may linger for a day or so).    -If you notice any symptoms after adding back in a higher FODMAP food than can wait a couple of days before trying the next higher FODMAP food.     -Also if you notice a significant increase in symptoms after retrying the first day, you do not need to eat on additional days    2. Keep daily food and beverage journals and track all symptoms you experience. Can use the FODMAP sander on [facility]'s site or other apps include My Symptoms Tracker or [employer] sander.    3. Plan menus and meals ahead of time to help you stick to the elimination diet.    4. Continue following a more plant-based diet if this is better tolerated.    5. Consistently drink at least 60 oz fluids or more such as water, herbal tea per day.    Nutrition Monitoring and Evaluation: Will monitor adherence to nutrition recommendations at future RD visits.     Further Medical Nutrition Therapy:  Annual visits  Patient was encouraged to call/contact RD with any further questions.    Alize Hoyt, MS, RD, LD        Again, thank you for allowing me to participate in the care of your patient.      Sincerely,    Alize Hoyt RD

## 2021-08-26 NOTE — NURSING NOTE
AHA BP    1st   103/69  2nd  107/70  3rd   105/71    Average   104/70  Katheryn Perez CMA 8:20 AM on 8/26/2021

## 2021-08-26 NOTE — PROGRESS NOTES
Bernadine Inman comes into clinic today at the request of Dr. CHICHO Peoples Ordering Provider for EKG.    This service provided today was under the supervising provider of the day Dr. CHICHO Peoples, who was available if needed.    Katheryn Perez, [facility]

## 2021-08-27 NOTE — OUTPATIENT NURSE NOTE
08/27/21 1236   Fitness   Current Fitness Regimen:  Occasional physical activity (peloton bike) about 2-3 times per week.   Fitness Goals Increase frequency of physical activity 5 times per week and improve cardiorespiratory fitness.   Timeline   Recommended Activity this Week 3 times per week   Recommended Minutes per Day this Week 30 Min   Recommended Number of Days this Week 3 Per Day/Per Week   Recommended Activity this Month 4 times per week   Recommended Duration this Month 45 Min/Hrs   Recommended Frequency this Month 4 Per Day/Per Week   Recommended Activity the Nex 3 Months Increase frequency of riding peloton to 5 time per week.   Recommended Duration the Nex 3 Months 60 Min/Hrs   Recommended Frequency the Nex 3 Months:  5 Per Day/Per Week   VO2 Max   VO2MAX:  35 ml/kg/min   VO2- max Percentile 40 %   Fitness Level Fair    Strength    Strength (Right):  67.7 ml/kg/min    Strength (Left) 67.9 ml/kg/min

## 2021-08-27 NOTE — PATIENT INSTRUCTIONS
It was a pleasure to meet you Mrs. Inman,    You cardiorespiratory fitness measured using VO2 max was of 35 mL/kg/min, which places you at the 40th percentile. Your ventilatory threshold (VT) occurred at a VO2 of 26.3 mL/kg/min, at a relative intensity of 75% of her VO2 max. Heart rate max was of 160 bpm and heart rate at VT was between 115-120 bpm. VT is a parameters that says the intensity at which you have been training for the last several months. A completely sedentary individual has a VT at 50-55% of their VO2 max. This means your levels of training has been moderate to vigorous.     If you are interested in improving your VO2 max, you should increase the intensity of your exercise sessions to a heart rate of 120 bpm or above and increase the frequency to 4-5 times per week. Your performance on the VO2 max trest will depend on the type of training that you are doing. If you train more on a treadmill instead of the peloton bike, your next VO2 max test will better resemble your true cardiorespiratory fitness.     Overall, you are a very healthy individual and I encourage that you at least keep up to the level of training that you have until now.     Looking forward to seeing you next time to measure your improvements.    Best regards    Shahram Howell PhD  Exercise Physiologist

## 2021-08-28 LAB
ATRIAL RATE - MUSE: 56 BPM
DIASTOLIC BLOOD PRESSURE - MUSE: NORMAL MMHG
INTERPRETATION ECG - MUSE: NORMAL
P AXIS - MUSE: -5 DEGREES
PR INTERVAL - MUSE: 152 MS
QRS DURATION - MUSE: 82 MS
QT - MUSE: 412 MS
QTC - MUSE: 397 MS
R AXIS - MUSE: 32 DEGREES
SYSTOLIC BLOOD PRESSURE - MUSE: NORMAL MMHG
T AXIS - MUSE: 43 DEGREES
VENTRICULAR RATE- MUSE: 56 BPM

## 2021-08-31 NOTE — PROGRESS NOTES
"Bernadine La is a very pleasant 40 year old female who was seen today for annual physical and executive health assessment.      She is overall enjoying good health, but does endorse a couple of concerns.  First, she has a strong family history of cancer, including breast and ovarian cancer; we discussed a referral to [facility] for a repeat [facility].  She had genetic counseling at [facility] 6 years ago, however, more family history is evident since then and I am fairly certain more extensive gene panels are available now compared to then.    We spent some time discussing migraines, which are well controlled on propranolol, occurring maybe 1-2 times a year.  She's had intermittent knee pain which responds to physical therapy.  We also discussed gut health, and a long history of what sounds like possible irritable bowel syndrome, diarrhea predominant. She knows how to manage with dietary changes, however I mentioned if her symptoms become more bothersome, we could consider a dietitian referral or a consult with Dr. Milady Baez in Gastroenterology.    Her health goals for the near future include eating a more plant based diet and getting more consistent exercise.  She enjoys working from home but feels overall less active.  She lives in [place] working as an .    Otherwise, no changes to past, family or social history per intake note dated 8/23 and  ROS: 10 point ROS neg other than the symptoms noted above in the HPI.    PHYSICAL EXAM:  /71   Pulse 58   Temp 97.5  F (36.4  C) (Oral)   Resp 16   Ht 1.676 m (5' 6\")   Wt 62.1 kg (137 lb)   LMP 07/24/2021   SpO2 99%   BMI 22.11 kg/m    Constitutional: no distress, comfortable, pleasant   Eyes: anicteric, normal extra-ocular movements   Ears, Nose and Throat: neck supple with full range of motion, no thyromegaly.   Cardiovascular: regular rate and rhythm, normal S1 and S2, no murmurs, rubs or gallops, " peripheral pulses full and symmetric   Respiratory: clear to auscultation, no wheezes or crackles, normal breath sounds   Gastrointestinal: positive bowel sounds, nontender, no hepatosplenomegaly, no masses   Musculoskeletal: knees full range of motion, no effusion or joint space narrowing  Skin: no concerning lesions, no jaundice   Neurological: normal gait, no tremor   Psychological: appropriate mood   Lymphatic: no cervical lymphadenopathy and no pedal edema        A/P  40 year old here for PE and executive health assessment, doing well.  Please see this encounter's lab result letter for a more detailed explanation of test results and recommendations.  Plan to refer to genetic counseling for cancer risk given family history.    Family history of malignant neoplasm of ovary  -     Cancer Risk Mgmt/Cancer Genetic Counseling Referral; Future    Encounter for preventative adult health care examination  -     HIV Antigen Antibody Combo [[id number]]  -     Lipid panel reflex to direct LDL Fasting  -     Vitamin D Deficiency  -     TSH  -     Glucose  -     Creatinine  -     Alkaline phosphatase  -     ALT  -     CBC with platelets differential    Other orders  -     Extra Tube    RTC in one year, or sooner satya Gardner MD

## 2021-09-08 NOTE — PROGRESS NOTES
Action    Action Taken 9/8/2021 4:13pm MARQUES     I called [facility] to request the following   -- 8/16/13 [facility] Comprehensive  Ph: [phone] - they will fax over the report.     I called pt Bernadine- her phone went to .

## 2021-09-13 ENCOUNTER — PRE VISIT (OUTPATIENT)
Dept: ONCOLOGY | Facility: CLINIC | Age: 41
End: 2021-09-13

## 2021-09-13 ENCOUNTER — VIRTUAL VISIT (OUTPATIENT)
Dept: ONCOLOGY | Facility: CLINIC | Age: 41
End: 2021-09-13
Attending: INTERNAL MEDICINE
Payer: COMMERCIAL

## 2021-09-13 DIAGNOSIS — Z80.3 FAMILY HISTORY OF MALIGNANT NEOPLASM OF BREAST: ICD-10-CM

## 2021-09-13 DIAGNOSIS — Z80.42 FAMILY HISTORY OF MALIGNANT NEOPLASM OF PROSTATE: ICD-10-CM

## 2021-09-13 DIAGNOSIS — Z80.41 FAMILY HISTORY OF MALIGNANT NEOPLASM OF OVARY: Primary | ICD-10-CM

## 2021-09-13 DIAGNOSIS — Z80.51 FAMILY HISTORY OF MALIGNANT NEOPLASM OF KIDNEY: ICD-10-CM

## 2021-09-13 PROCEDURE — 96040 HC GENETIC COUNSELING, EACH 30 MINUTES: CPT | Mod: GT,95 | Performed by: GENETIC COUNSELOR, MS

## 2021-09-13 NOTE — PROGRESS NOTES
2021    Referring Provider: Noa Gardner MD    Presenting Information:   I met with Bernadine La for her video genetic counseling visit, through the Cancer Risk Management Program, to discuss her family history of ovarian, kidney, breast, and prostate cancer. Today we reviewed this history, cancer screening recommendations, and available genetic testing options.    Personal History:  Bernadine La is a 40 year old year old female. She does not have any personal history of cancer. She had her first menstrual period at age 11, her first child at age 32, and is premenopausal. Bernadine La has her ovaries, fallopian tubes and uterus in place, and she has had no ovarian cancer screening to date. She reports that she has not used hormone replacement therapy. She has annual clinical breast exams and mammograms; her most recent mammogram in 2021 was normal. Bernadine La has not had a colonoscopy. She does not regularly do any other cancer screening at this time.     Family History: (Please see scanned pedigree for detailed family history information)    Bernadine's sister was diagnosed with ovarian cancer at age 29. Given her age of diagnosis, this was most like a germ cell ovarian tumor.    A maternal uncle was diagnosed with kidney cancer in his 60's.    Bernadine's maternal grandfather was diagnosed with both prostate cancer and kidney cancer in his 60's and  in his mid-70's.    A paternal aunt is reported to have been diagnosed with breast cancer three times in her 50's. It is unknown how many of these were recurrences vs new primary breast cancers.    A paternal aunt was diagnosed with ovarian cancer in her mid-60's.    A paternal uncle and Bernadine's paternal grandfather were both diagnosed with lung cancer at unknown ages.    Bernadine's paternal grandmother  from multiple myeloma at an unknown age.    Her maternal and paternal ethnicity is Hebrew. There is no known Ashkenazi Mormon ancestry on either side of  her family. There is no reported consanguinity.    Discussion:    Bernadine La's family history of ovarian, breast, kidney, and prostate cancer is suggestive of a hereditary cancer syndrome.    We reviewed the features of sporadic, familial, and hereditary cancers. We discussed that mutations in either BRCA1 or BRCA2 could be possible hereditary explanations for her family history of cancer. Mutations in the BRCA1 or BRCA2 gene are known to cause Hereditary Breast and Ovarian Cancer Syndrome (HBOC). HBOC typically presents with multiple family members diagnosed with breast cancer before age 50 and/or ovarian cancer. Other cancer risks associated with HBOC include male breast cancer, prostate cancer, pancreatic cancer, and melanoma.     We discussed the natural history and genetics of hereditary cancer. A detailed handout regarding hereditary cancer, along with the other information we discussed, will be mailed to Bernadine La at the end of our appointment today and can be found in the after visit summary. Topics included: inheritance pattern, cancer risks, cancer screening recommendations, and also risks, benefits and limitations of testing.    Based on her personal and family history, Bernadine La meets current National Comprehensive Cancer Network (NCCN) criteria for genetic testing of BRCA1/2 along with other high-penetrance breast and/or ovarian cancer susceptibility genes (I.e. CDH1, PALB2, PTEN, and TP53).    We discussed that there are additional genes that could cause increased risk for breast, ovarian, prostate, and kidney cancer. As many of these genes present with overlapping features in a family and accurate cancer risk cannot always be established based upon the pedigree analysis alone, it would be reasonable for Bernadine La to consider panel genetic testing to analyze multiple genes at once.  Genetic testing is available for 58 genes associated with hereditary cancer: Custom Cancers panel (APC, RAMBO, AXIN2,  BAP1, BARD1, BMPR1A, BRCA1, BRCA2, BRIP1, CDC73, CDH1, CDK4, CDKN2A, CDKN1C, CHEK2, CTNNA1, DICER1, DIS3L2, EPCAM, FH, FLCN, GREM1, GPC3, HOXB13, KIT, MEN1, MET, MLH1, MSH2, MSH3, MSH6, MUTYH, NBN, NF1, NTHL1, PALB2, PDGFRA, PMS2, POLD1, POLE, PTEN, RAD50, RAD51C, RAD51D, REST, SDHA, SDHB, SDHC, SDHD, SMAD4, SMARCA4, SMARCB1, STK11, TP53, TSC1, TSC2, VHL, and WT1).  We discussed that many of the genes in the Custom Cancers panel are associated with specific hereditary cancer syndromes and published management guidelines: Hereditary Breast and Ovarian Cancer syndrome (BRCA1, BRCA2), Castillo syndrome (MLH1, MSH2, MSH6, PMS2, EPCAM), Familial Adenomatous Polyposis (APC), Hereditary Diffuse Gastric Cancer (CDH1), Familial Atypical Multiple Mole Melanoma syndrome (CDK4, CDKN2A), Juvenile Polyposis syndrome (BMPR1A, SMAD4), Cowden syndrome (PTEN), Li Fraumeni syndrome (TP53), Peutz-Jeghers syndrome (STK11), MUTYH Associated Polyposis (MUTYH), Hereditary Leiomyomatosis and Renal Cell Cancer (FH), Dkcr-Njsu-Iqcb (FLCN), Hereditary Papillary Renal Carcinoma (MET), Tuberous Sclerosis complex (TSC1, TSC2), Neurofibromatosis type 1 (NF1), Multiple Endocrine Neoplasia type 1 (MEN1), Hereditary Paraganglioma and Pheochromocytoma (SDHA, SDHB, SDHC, SDHD), and von Hippel-Lindau (VHL).   The RAMBO, AXIN2, BRIP1, CHEK2, GREM1, MSH3, NBN, NTHL1, PALB2, POLD1, POLE, RAD51C, and RAD51D genes are associated with increased cancer risk and have published management guidelines for certain cancers.    The remaining genes (BAP1, BARD1, CDC73, CDKN1C, CTNNA1, DICER1, DIS3L2, GPC3, HOXB13, KIT, PDGFRA, RAD50, REST, SMARCA4, SMARCB1, WT1) are associated with increased cancer risk and may allow us to make medical recommendations when mutations are identified.      Bernadine La stated that she would prefer to submit a saliva kit for her genetic testing. [facility] will send a kit directly to her home with directions on how to collect a saliva sample. We  discussed that there is a small chance for sample failure due to contamination of the sample. To help minimize this, she should follow the directions that are sent with the kit. Bernadine La verbalized understanding of this. Once the sample is collected, she will send it to [employer] using the return envelope and prepaid shipping label.     Verbal consent was given over video and written on the consent form due to COVID-19 restrictions. Turnaround time is approximately 4 weeks once the lab receives the sample.    Medical Management: For Bernadine La, we reviewed that the information from genetic testing may determine:    additional cancer screening for which Bernadine aL may qualify (i.e. mammogram and breast MRI, more frequent colonoscopies, more frequent dermatologic exams, etc.),    options for risk reducing surgeries Bernadine La could consider (i.e. bilateral mastectomy, surgery to remove her ovaries and/or uterus, etc.),      and targeted chemotherapies for Bernadine La if she were to develop certain cancers in the future (i.e. immunotherapy for individuals with Castillo syndrome, PARP inhibitors, etc.).     These recommendations and possible targeted chemotherapies will be discussed in detail once genetic testing is completed.     Plan:  1) Today Bernadine La elected to proceed with a Custom panel through [facility].  2) A copy of the consent form and the after visit summary will be mailed to Bernadine La.  3) This information should be available in approximately 4 weeks, once the lab receives the sample.  4) I will call Bernadine La with the results once they become available.    Time spent on video: 49 minutes    Jean-Paul Hess MS, [facility] [place] [facility]  Licensed, Certified Genetic Counselor  (P): [phone]  (F): [phone]

## 2021-09-13 NOTE — LETTER
Cancer Risk Management  Program Locations    [facility]  [facility]  [facility]  [facility]  Mailing Address  Cancer Risk Management Program  [address]  [place] [address]    New patient appointments  [phone]  September 13, 2021    Bernadine Inman  [address]  [facility] [address]      Dear Bernadine La,    It was a pleasure speaking with you over video on 9/13/2021. Here is a copy of the progress note from our discussion. If you have any additional questions, please feel free to call.    Referring Provider: Noa Gardner MD    Presenting Information:   I met with Bernadine La for her video genetic counseling visit, through the Cancer Risk Management Program, to discuss her family history of ovarian, kidney, breast, and prostate cancer. Today we reviewed this history, cancer screening recommendations, and available genetic testing options.    Personal History:  Bernadine La is a 40 year old year old female. She does not have any personal history of cancer. She had her first menstrual period at age 11, her first child at age 32, and is premenopausal. Bernadine La has her ovaries, fallopian tubes and uterus in place, and she has had no ovarian cancer screening to date. She reports that she has not used hormone replacement therapy. She has annual clinical breast exams and mammograms; her most recent mammogram in August 2021 was normal. Bernadine La has not had a colonoscopy. She does not regularly do any other cancer screening at this time.     Family History: (Please see scanned pedigree for detailed family history information)    Bernadine's sister was diagnosed with ovarian cancer at age 29. Given her age of diagnosis, this was most like a germ cell ovarian tumor.    A maternal uncle was diagnosed with kidney  cancer in his 60's.    Bernadine's maternal grandfather was diagnosed with both prostate cancer and kidney cancer in his 60's and  in his mid-70's.    A paternal aunt is reported to have been diagnosed with breast cancer three times in her 50's. It is unknown how many of these were recurrences vs new primary breast cancers.    A paternal aunt was diagnosed with ovarian cancer in her mid-60's.    A paternal uncle and Bernadine's paternal grandfather were both diagnosed with lung cancer at unknown ages.    Bernadine's paternal grandmother  from multiple myeloma at an unknown age.    Her maternal and paternal ethnicity is Upper sorbian. There is no known Ashkenazi Buddhist ancestry on either side of her family. There is no reported consanguinity.    Discussion:    Bernadine La's family history of ovarian, breast, kidney, and prostate cancer is suggestive of a hereditary cancer syndrome.    We reviewed the features of sporadic, familial, and hereditary cancers. We discussed that mutations in either BRCA1 or BRCA2 could be possible hereditary explanations for her family history of cancer. Mutations in the BRCA1 or BRCA2 gene are known to cause Hereditary Breast and Ovarian Cancer Syndrome (HBOC). HBOC typically presents with multiple family members diagnosed with breast cancer before age 50 and/or ovarian cancer. Other cancer risks associated with HBOC include male breast cancer, prostate cancer, pancreatic cancer, and melanoma.     We discussed the natural history and genetics of hereditary cancer. A detailed handout regarding hereditary cancer, along with the other information we discussed, will be mailed to Bernadine La at the end of our appointment today and can be found in the after visit summary. Topics included: inheritance pattern, cancer risks, cancer screening recommendations, and also risks, benefits and limitations of testing.    Based on her personal and family history, Bernadine La meets current National Comprehensive Cancer  Network (NCCN) criteria for genetic testing of BRCA1/2 along with other high-penetrance breast and/or ovarian cancer susceptibility genes (I.e. CDH1, PALB2, PTEN, and TP53).    We discussed that there are additional genes that could cause increased risk for breast, ovarian, prostate, and kidney cancer. As many of these genes present with overlapping features in a family and accurate cancer risk cannot always be established based upon the pedigree analysis alone, it would be reasonable for Bernadine La to consider panel genetic testing to analyze multiple genes at once.  Genetic testing is available for 58 genes associated with hereditary cancer: Custom Cancers panel (APC, RAMBO, AXIN2, BAP1, BARD1, BMPR1A, BRCA1, BRCA2, BRIP1, CDC73, CDH1, CDK4, CDKN2A, CDKN1C, CHEK2, CTNNA1, DICER1, DIS3L2, EPCAM, FH, FLCN, GREM1, GPC3, HOXB13, KIT, MEN1, MET, MLH1, MSH2, MSH3, MSH6, MUTYH, NBN, NF1, NTHL1, PALB2, PDGFRA, PMS2, POLD1, POLE, PTEN, RAD50, RAD51C, RAD51D, REST, SDHA, SDHB, SDHC, SDHD, SMAD4, SMARCA4, SMARCB1, STK11, TP53, TSC1, TSC2, VHL, and WT1).  We discussed that many of the genes in the Custom Cancers panel are associated with specific hereditary cancer syndromes and published management guidelines: Hereditary Breast and Ovarian Cancer syndrome (BRCA1, BRCA2), Castillo syndrome (MLH1, MSH2, MSH6, PMS2, EPCAM), Familial Adenomatous Polyposis (APC), Hereditary Diffuse Gastric Cancer (CDH1), Familial Atypical Multiple Mole Melanoma syndrome (CDK4, CDKN2A), Juvenile Polyposis syndrome (BMPR1A, SMAD4), Cowden syndrome (PTEN), Li Fraumeni syndrome (TP53), Peutz-Jeghers syndrome (STK11), MUTYH Associated Polyposis (MUTYH), Hereditary Leiomyomatosis and Renal Cell Cancer (FH), Nklf-Qczx-Irxz (FLCN), Hereditary Papillary Renal Carcinoma (MET), Tuberous Sclerosis complex (TSC1, TSC2), Neurofibromatosis type 1 (NF1), Multiple Endocrine Neoplasia type 1 (MEN1), Hereditary Paraganglioma and Pheochromocytoma (SDHA, SDHB, SDHC, SDHD),  and von Hippel-Lindau (VHL).   The RAMBO, AXIN2, BRIP1, CHEK2, GREM1, MSH3, NBN, NTHL1, PALB2, POLD1, POLE, RAD51C, and RAD51D genes are associated with increased cancer risk and have published management guidelines for certain cancers.    The remaining genes (BAP1, BARD1, CDC73, CDKN1C, CTNNA1, DICER1, DIS3L2, GPC3, HOXB13, KIT, PDGFRA, RAD50, REST, SMARCA4, SMARCB1, WT1) are associated with increased cancer risk and may allow us to make medical recommendations when mutations are identified.      Bernadine La stated that she would prefer to submit a saliva kit for her genetic testing. [employer] will send a kit directly to her home with directions on how to collect a saliva sample. We discussed that there is a small chance for sample failure due to contamination of the sample. To help minimize this, she should follow the directions that are sent with the kit. Bernadine La verbalized understanding of this. Once the sample is collected, she will send it to [employer] using the return envelope and prepaid shipping label.     Verbal consent was given over video and written on the consent form due to COVID-19 restrictions. Turnaround time is approximately 4 weeks once the lab receives the sample.    Medical Management: For Bernadine La, we reviewed that the information from genetic testing may determine:    additional cancer screening for which Bernadine La may qualify (i.e. mammogram and breast MRI, more frequent colonoscopies, more frequent dermatologic exams, etc.),    options for risk reducing surgeries Bernadine La could consider (i.e. bilateral mastectomy, surgery to remove her ovaries and/or uterus, etc.),      and targeted chemotherapies for Bernadine La if she were to develop certain cancers in the future (i.e. immunotherapy for individuals with Castillo syndrome, PARP inhibitors, etc.).     These recommendations and possible targeted chemotherapies will be discussed in detail once genetic testing is completed.     Plan:  1) Today  Bernadine La elected to proceed with a Custom panel through [facility].  2) A copy of the consent form and the after visit summary will be mailed to Bernadine La.  3) This information should be available in approximately 4 weeks, once the lab receives the sample.  4) I will call Bernadine La with the results once they become available.    Time spent on video: 49 minutes    Jean-Paul Hess MS, [facility] [place]  Licensed, Certified Genetic Counselor  (P): [phone]  (F): [phone]

## 2021-09-18 PROBLEM — D22.9 NEVUS SPILUS: Status: ACTIVE | Noted: 2021-09-18

## 2021-09-18 PROBLEM — D22.9 MULTIPLE MELANOCYTIC NEVI: Status: ACTIVE | Noted: 2021-09-18

## 2021-09-18 PROBLEM — Z12.83 SKIN CANCER SCREENING: Status: ACTIVE | Noted: 2021-09-18

## 2021-10-19 NOTE — PATIENT INSTRUCTIONS
Assessing Cancer Risk  Only about 5-10% of cancers are thought to be due to an inherited cancer susceptibility gene.    These families often have:    Several people with the same or related types of cancer    Cancers diagnosed at a young age (before age 50)    Individuals with more than one primary cancer    Multiple generations of the family affected with cancer    Some people may be candidates for genetic testing of more than one gene.  For these families, genetic testing using a cancer panel may be offered.  These panels will test different genes known to increase the risk for breast, ovarian, uterine, and/or other cancers. All of the genes discussed below have published clinical management guidelines for individuals who are found to carry a mutation. The purpose of this handout is to serve as a brief summary of the genes analyzed by the panels used to inquire about hereditary breast and gynecologic cancer:  RAMBO, BRCA1, BRCA2, BRIP1, CDH1, CHEK2, MLH1, MSH2, MSH6, PMS2, EPCAM, PTEN, PALB2, RAD51C, RAD51D, and TP53.  ______________________________________________________________________________  Hereditary Breast and Ovarian Cancer Syndrome   (BRCA1 and BRCA2)  A single mutation in one of the copies of BRCA1 or BRCA2 increases the risk for breast and ovarian cancer, among others.  The risk for pancreatic cancer and melanoma may also be slightly increased in some families.  The chart below shows the chance that someone with a BRCA mutation would develop cancer in his or her lifetime1,2,3,4.        A person s ethnic background is also important to consider, as individuals of Ashkenazi Mor[place] ancestry have a higher chance of having a BRCA gene mutation.  There are three BRCA mutations that occur more frequently in this population.    Castillo Syndrome   (MLH1, MSH2, MSH6, PMS2, and EPCAM)  Currently five genes are known to cause Castillo Syndrome: MLH1, MSH2, MSH6, PMS2, and EPCAM.  A single mutation in one of the  Castillo Syndrome genes increases the risk for colon, endometrial, ovarian, and stomach cancers.  Other cancers that occur less commonly in Castillo Syndrome include urinary tract, skin, and brain cancers.  The chart below shows the chance that a person with Castillo syndrome would develop cancer in his or her lifetime5.      *Cancer risk varies depending on Castillo syndrome gene found    Cowden Syndrome   (PTEN)  Cowden syndrome is a hereditary condition that increases the risk for breast, thyroid, endometrial, colon, and kidney cancer.  Cowden syndrome is caused by a mutation in the PTEN gene.  A single mutation in one of the copies of PTEN causes Cowden syndrome and increases cancer risk.  The chart below shows the chance that someone with a PTEN mutation would develop cancer in their lifetime6,7.  Other benign features seen in some individuals with Cowden syndrome include benign skin lesions (facial papules, keratoses, lipomas), learning disability, autism, thyroid nodules, colon polyps, and larger head size.      *One recent study found breast cancer risk to be increased to 85%    Li-Fraumeni Syndrome   (TP53)  Li-Fraumeni Syndrome (LFS) is a cancer predisposition syndrome caused by a mutation in the TP53 gene. A single mutation in one of the copies of TP53 increases the risk for multiple cancers. Individuals with LFS are at an increased risk for developing cancer at a young age. The lifetime risk for development of a LFS-associated cancer is 50% by age 30 and 90% by age 60.   Core Cancers: Sarcomas, Breast, Brain, Lung, Leukemias/Lymphomas, Adrenocortical carcinomas  Other Cancers: Gastrointestinal, Thyroid, Skin, Genitourinary    Hereditary Diffuse Gastric Cancer   (CDH1)  Currently, one gene is known to cause hereditary diffuse gastric cancer (HDGC): CDH1.  Individuals with HDGC are at increased risk for diffuse gastric cancer and lobular breast cancer. Of people diagnosed with HDGC, 30-50% have a mutation in the CDH1  gene.  This suggests there are likely other genes that may cause HDGC that have not been identified yet.      Lifetime Cancer Risks    General Population HDGC    Diffuse Gastric  <1% ~80%   Breast 12% 39-52%         Additional Genes  RAMBO  RAMBO is a moderate-risk breast cancer gene. Women who have a mutation in RAMBO can have between a 2-4 fold increased risk for breast cancer compared to the general population8. RAMBO mutations have also been associated with increased risk for pancreatic cancer, however an estimate of this cancer risk is not well understood9. Individuals who inherit two RAMBO mutations have a condition called ataxia-telangiectasia (AT).  This rare autosomal recessive condition affects the nervous system and immune system, and is associated with progressive cerebellar ataxia beginning in childhood.  Individuals with ataxia-telangiectasia often have a weakened immune system and have an increased risk for childhood cancers.    PALB2  Mutations in PALB2 have been shown to increase the risk of breast cancer up to 33-58% in some families; where individuals fall within this risk range is dependent upon family [id number]. PALB2 mutations have also been associated with increased risk for pancreatic cancer, although this risk has not been quantified yet.  Individuals who inherit two PALB2 mutations--one from their mother and one from their father--have a condition called Fanconi Anemia.  This rare autosomal recessive condition is associated with short stature, developmental delay, bone marrow failure, and increased risk for childhood cancers.    CHEK2   CHEK2 is a moderate-risk breast cancer gene.  Women who have a mutation in CHEK2 have around a 2-fold increased risk for breast cancer compared to the general population, and this risk may be higher depending upon family history.11,12,13 Mutations in CHEK2 have also been shown to increase the risk of a number of other cancers, including colon and prostate, however  these cancer risks are currently not well understood.    BRIP1, RAD51C and RAD51D  Mutations in BRIP1, RAD51C, and RAD51D have been shown to increase the risk of ovarian cancer and possibly female breast cancer as well14,15 .       Lifetime Cancer Risk    General Population BRIP1 RAD51C RAD51D   Ovarian 1-2% ~5-8% ~5-9% ~7-15%           Inheritance  All of the cancer syndromes reviewed above are inherited in an autosomal dominant pattern.  This means that if a parent has a mutation, each of his or her children will have a 50% chance of inheriting that same mutation.  Therefore, each child--male or female--would have a 50% chance of being at increased risk for developing cancer.      Image obtained from Genetics Home Reference, 2013     Mutations in some genes can occur de trent, which means that a person s mutation occurred for the first time in them and was not inherited from a parent.  Now that they have the mutation, however, it can be passed on to future generations.    Genetic Testing  Genetic testing involves a blood test and will look at the genetic information in the RAMBO, BRCA1, BRCA2, BRIP1, CDH1, CHEK2, MLH1, MSH2, MSH6, PMS2, EPCAM, PTEN, PALB2, RAD51C, RAD51D, and TP53 genes for any harmful mutations that are associated with increased cancer risk.  If possible, it is recommended that the person(s) who has had cancer be tested before other family members.  That person will give us the most useful information about whether or not a specific gene is associated with the cancer in the family.    Results  There are three possible results of genetic testing:    Positive--a harmful mutation was identified in one or more of the genes    Negative--no mutation was identified in any of the genes on this panel    Variant of unknown significance--a variation in one of the genes was identified, but it is unclear how this impacts cancer risk in the family    Advantages and Disadvantages   There are advantages and  disadvantages to genetic testing.    Advantages    May clarify your cancer risk    Can help you make medical decisions    May explain the cancers in your family    May give useful information to your family members (if you share your results)    Disadvantages    Possible negative emotional impact of learning about inherited cancer risk    Uncertainty in interpreting a negative test result in some situations    Possible genetic discrimination concerns (see below)    Genetic Information Nondiscrimination Act (MARVEL)  MARVEL is a federal law that protects individuals from health insurance or employment discrimination based on a genetic test result alone.  Although rare, there are currently no legal discrimination protections in terms of life insurance, long term care, or disability insurances.  Visit the [employer] [place] website to learn more.    Reducing Cancer Risk  All of the genes described above have nationally recognized cancer screening guidelines that would be recommended for individuals who test positive.  In addition to increased cancer screening, surgeries may be offered or recommended to reduce cancer risk.  Recommendations are based upon an individual s genetic test result as well as their personal and family history of cancer.    Questions to Think About Regarding Genetic Testing:    What effect will the test result have on me and my relationship with my family members if I have an inherited gene mutation?  If I don t have a gene mutation?    Should I share my test results, and how will my family react to this news, which may also affect them?    Are my children ready to learn new information that may one day affect their own health?    Hereditary Cancer Resources    FORCE: Facing Our Risk of Cancer Empowered facingourrisk.org   Bright Pink bebrightpink.org   Li-Fraumeni Syndrome Association lfsassociation.org   PTEN World PTENworld.com   No stomach for cancer, Inc.  nostomachforcancer.org   Stomach cancer relief network Scrnet.org   Collaborative Group of the Americas on Inherited Colorectal Cancer (CGA) cgaicc.com    Cancer Care cancercare.org   American Cancer Society (ACS) cancer.org   National Cancer [place] (NCI) cancer.gov     Please call us if you have any questions or concerns.   Cancer Risk Management Program [phone] ([phone])  ? Jean-Paul Hess, MS, [facility] [phone]  ? Beverley Petersen, MS, [facility]  [phone]  ? Jill Krishnan, MS, [facility]  [phone]  ? Zoe Dylon, MS, [facility] [phone]  ? Mirna Khadijah, MS, [facility] [phone]  ? Alma Romario, MS, [facility]  [phone]  ? Janessa Bethea, MS  [phone]    References  1. Jonnie STYLES, Braeden PDP, Laura S, Jacque LIGHT, Kimberlyn JE, Vasquez JL, Jairo N, Toni H, Law O, Klever A, Jt B, Radishanta P, Manibeth S, Fede DM, Marlow N, Ruby E, Katie H, Hector E, Noreen J, Gronjd J, Tiff B, Keyonna H, Thorlacius S, Eerola H, Nevpriscana H, Lila K, Jennifer OP. Average risks of breast and ovarian cancer associated with BRCA1 or BRCA2 mutations detected in case series unselected for family history: a combined analysis of 222 studies. Am J Hum Sumaya. 2003;72:1117-30.  2. Hilario N, Lorraine M, Carla G.  BRCA1 and BRCA2 Hereditary Breast and Ovarian Cancer. Gene Reviews online. 2013.  3. Xu YC, Amanda S, Guera G, Carver S. Breast cancer risk among male BRCA1 and BRCA2 mutation carriers. J Natl Cancer Inst. 2007;99:1811-4.  4. Khari VU, Melissa I, Leighton J, Ilia E, Eugene ER, Bertha F. Risk of breast cancer in male BRCA2 carriers. J Med Sumaya. 2010;47:710-1.  5. National Comprehensive Cancer Network. Clinical practice guidelines in oncology, colorectal cancer screening. Available online (registration required). 2015.  6. Yobani BEAR, Patti J, Lalitha J, Cristiane LA, Cassie ORTEGA, Eng C. Lifetime cancer risks in individuals with germline PTEN mutations. Clin Cancer Res. 2012;18:400-7.  7. Pilarski R. Cowden  Syndrome: A Critical Review of the Clinical Literature. J Sumaya . 2009:18:13-27.  8. Libia A, Johnny D, Prema S, Geovanna P, Schuyler T, Nicole M, Rodger B, Alma H, Mikaela R, Alecia K, Huber L, Khari DG, Fede D, Luis DF, Jeny MR, The Breast Cancer Susceptibility Collaboration (UK) & Kasey GLOVER. RAMBO mutations that cause ataxia-telangiectasia are breast cancer susceptibility alleles. Nature Genetics. 2006;38:873-875  9. Jg N , Vick Y, Coni J, Jacob L, Jovan GM , Davon ML, Gallinger S, Conway AG, Syngal S, Nichol ML, Anai J , Maggie R, Ankit SZ, Robinson JR, Eduardo VE, Nolan M, Vokin B, Cyn N, Camryn RH, Earnestine KW, and Alma AP. RAMBO mutations in patients with hereditary pancreatic cancer. Cancer Discover. 2012;2:41-46  10. Jonnie INGRAM, et al. Breast-Cancer Risk in Families with Mutations in PALB2. NEJM. 2014; 371(6):497-506.  11. CHEK2 Breast Cancer Case-Control Consortium. CHEK2*1100delC and susceptibility to breast cancer: A collaborative analysis involving 10,860 breast cancer cases and 9,065 controls from 10 studies. Am J Hum Sumaya, 74 (2004), pp. 1524-4084  12. Kash T, Celia S, Jose Carlos K, et al. Spectrum of Mutations in BRCA1, BRCA2, CHEK2, and TP53 in Families at High Risk of Breast Cancer. ANIKET. 2006;295(12):7940-6998.   13. Phyllis C, Mariam D, Minal A, et al. Risk of breast cancer in women with a CHEK2 mutation with and without a family history of breast cancer. J Clin Oncol. 2011;29:9480-3941.  14. Satnam H, Isaura E, Jace SJ, et al. Contribution of germline mutations in the RAD51B, RAD51C, and RAD51D genes to ovarian cancer in the population. J Clin Oncol. 2015;33(26):7647-8985. Doi:10.1200/JCO.2015.61.2408.  15. Kevon T, Robby DORANTES, Jason P, et al. Mutations in BRIP1 confer high risk of ovarian cancer. Rabia Sumaya. 2011;43(11):5600-3774. doi:10.1038/ng.955.

## 2021-11-02 ENCOUNTER — TELEPHONE (OUTPATIENT)
Dept: ONCOLOGY | Facility: CLINIC | Age: 41
End: 2021-11-02

## 2021-11-02 NOTE — TELEPHONE ENCOUNTER
11/2/21 - [facility] to call [phone] opt5, opt2 to schedule return virtual visit with Jean-Paul Hess ANYTIME for genetic testing results.-Joon

## 2021-11-05 ENCOUNTER — VIRTUAL VISIT (OUTPATIENT)
Dept: ONCOLOGY | Facility: CLINIC | Age: 41
End: 2021-11-05
Attending: GENETIC COUNSELOR, MS
Payer: COMMERCIAL

## 2021-11-05 DIAGNOSIS — Z80.3 FAMILY HISTORY OF MALIGNANT NEOPLASM OF BREAST: ICD-10-CM

## 2021-11-05 DIAGNOSIS — Z80.42 FAMILY HISTORY OF MALIGNANT NEOPLASM OF PROSTATE: ICD-10-CM

## 2021-11-05 DIAGNOSIS — Z15.03 MUTATION IN HOXB13 GENE: ICD-10-CM

## 2021-11-05 DIAGNOSIS — Z80.51 FAMILY HISTORY OF MALIGNANT NEOPLASM OF KIDNEY: ICD-10-CM

## 2021-11-05 DIAGNOSIS — Z80.41 FAMILY HISTORY OF MALIGNANT NEOPLASM OF OVARY: Primary | ICD-10-CM

## 2021-11-05 PROCEDURE — 999N000069 HC STATISTIC GENETIC COUNSELING, < 16 MIN: Mod: GT,95 | Performed by: GENETIC COUNSELOR, MS

## 2021-11-05 NOTE — Clinical Note
"Hello,    Please enclose a copy of the test report from the laboratory tab titled \"laboratory miscellaneous order\" dated 10/4/21 (Order [id number]) to send to the patient along with the letter.    Referring provider: please see summary of genetic test results below.    Thank you,  Jean-Paul"

## 2021-11-05 NOTE — LETTER
Cancer Risk Management  Program Locations    [facility]  [facility]  [facility]  Mailing Address  Cancer Risk Management Program  [address]  [place] [address]    New patient appointments  [phone]  November 5, 2021    Bernadine Inman  [address]  [facility] [address]    Dear Bernadine La,    It was a pleasure speaking with you over video on 11/5/2021. Here is a copy of the progress note from our discussion. If you have any additional questions, please feel free to call.    Referring Provider: Noa Gardner MD    Presenting Information:   I spoke to Bernadine La by video today to discuss her genetic testing results. A saliva kit was sent to her house on 9/14/21. A custom panel was ordered from [facility]. This testing was done because of Bernadine La's family history of breast, ovarian, prostate, and kidney cancer.     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 We discussed that this mutation is associated with an increased risk for prostate cancer. We discussed the impact of this testing on Bernadine La in detail.     Of note, Bernadine La is negative for mutations in APC, RAMBO, AXIN2, BAP1, BARD1, BMPR1A, BRCA1, BRCA2, BRIP1, CDC73, CDH1, CDK4, CDKN2A, CDKN1C, CHEK2, CTNNA1, DICER1, DIS3L2, EPCAM, FH, FLCN, GREM1, GPC3, KIT, MEN1, MET, MLH1, MSH2, MSH3, MSH6, MUTYH, NBN, NF1, NTHL1, PALB2, PDGFRA, PMS2, POLD1, POLE, PTEN, RAD50, RAD51C, RAD51D, REST, SDHA, SDHB, SDHC, SDHD, SMAD4, SMARCA4, SMARCB1, STK11, TP53, TSC1, TSC2, VHL, and WT1. No mutations were found in any of the other 57 genes analyzed. This test involved sequencing and deletion/duplication analysis of all genes with the exceptions of EPCAM  "and GREM1 (deletions/duplications only) and SDHA (sequencing only). We reviewed the autosomal dominant inheritance of these genes. Bernadine La cannot pass on a mutation in any of these genes to her children based on this test result. Mutations in these genes do not skip generations.      A copy of the test report can be found in the Laboratory tab, dated 10/4/21, and named \"LABORATORY MISCELLANEOUS ORDER\". The report is scanned in as a linked document.    Cancer Risks:  We reviewed that mutations in the HOXB13 gene are associated with increased prostate cancer risk. The p.G84E mutation in HOXB13 has been reported as a  mutation in [place] and other [place] countries (Yina R et al, Eur Urol, 2014, 65:169-176).   ? Studies suggest the risk of prostate cancer with this particular mutation, p.G84E, to be as high as 3-4 times the population risk.    ? Of note, a recent study by Augusta T et al (Eur Urol, 2018) suggests that the lifetime prostate cancer risk may be influenced by the family history. This means individuals with a significant family history of prostate cancer may have a higher lifetime prostate cancer risk as compared to those with no family history of prostate cancer. Per this study, the average lifetime prostate cancer risk is estimated to be 62% for men with a HOXB13 mutation and no significant family history of prostate cancer. More studies are needed, though, to clarify this risk.  ? Studies also suggest that the prostate cancers associated with HOXB13 mutations are diagnosed at a younger age, but are not necessarily more aggressive than general population prostate cancers. Additional studies are needed, though, to confirm these findings as well.  ? Other cancer risks associated with a HOXB13 mutation, particularly female cancer risks, are unknown at this time.      Cancer Screening and Prevention:  There are currently no published prostate cancer screening guidelines specific to men who " carry a HOXB13 mutation.   ? The American Cancer Society currently recommends that individuals at high risk for prostate cancer discuss prostate cancer screening with a physician. Since the risk of prostate cancer is increased with a HOXB13 mutation, screening could be considered as early as age 40-45. This may vary based on family history, though.  ? Screening typically involves a PSA blood test and can also include a digital rectal exam (GAIL). Final screening recommendations should be made by each individual's physician.    Other screening based on Bernadine La's personal and family history:    Based on her personal and family history, Bernadine La has a 15.8% lifetime risk of developing breast cancer based on the NAT model. Therefore, Bernadine La does not meet current National Comprehensive Cancer Network (NCCN) guidelines for high risk breast screening, which is offered to women with a 20% lifetime risk or higher. However, it is still important for Bernadine La to continue with routine breast screening under the care of her physicians. Breast cancer screening is generally recommended to begin approximately 10 years younger than the earliest age of breast cancer diagnosis in the family, or at age 40, whichever comes first. In this family, screening may begin at age 40. Bernadine La is encouraged to discuss breast screening with her physicians.     Due to Bernadine La's family history of ovarian cancer, Bernadine La and other close female relatives may remain at slightly increased risk for ovarian cancer. We discussed that this risk is dependent on her sister's pathology. If her sister's ovarian cancer was a germ cell tumor or a teratoma, Bernadine La is likely not at increased risk for ovarian cancer. If her sister's ovarian cancer was epithelial cancer, then Bernadine La may be at increased risk. We discussed available ovarian cancer screening (pelvic exams, CA-125 blood tests, and transvaginal ultrasounds) as well as the  significant limitations of this screening. As such, this screening is not typically recommended. That being said, women in this family should discuss this screening and the signs and symptoms of ovarian cancer with their primary OB/GYN provider, as they may have individualized recommendations.    Other population cancer screening options, such as those recommended by the American Cancer Society and the National Comprehensive Cancer Network (NCCN), are also appropriate for Bernadine La and her family. These screening recommendations may change if there are changes to Bernadine La's personal and/or family history of cancer. Final screening recommendations should be made by each individual's primary care provider.    Of note, the above information is based on our current understanding of Bernadine La's genetic findings. Bernadine La is encouraged to reach out to me regularly regarding any pertinent updates to her personal and/or family history of cancer, as our understanding of the genetic findings in her family may change over time.     Implications for Family Members:  We reviewed that mutations in the HOSB13 gene are inherited in an autosomal dominant pattern. This means that each of Bernadine La's children have a 50% chance of inheriting the same mutation. Likewise, each of her siblings has a 50% risk of having the same mutation. Extended relatives may also carry this mutation, and she is encouraged to share this information with her family members on both sides of the family. I am happy to help her relatives connect with a genetic counselor in their area if they would like to discuss testing.    Additional Testing Considerations:  Even though Bernadine La's genetic testing result was positive, other relatives may carry a different gene mutation associated with breast, ovarian, kidney, and/or prostate cancer. Genetic counseling is recommended for other close relative, especially those with cancer to discuss genetic testing  "options. If any of these relatives do pursue genetic testing, Bernadine La is encouraged to contact me so that we may review the impact of their test results on her.    Plan:  1.  A copy of the test results will be mailed to Bernadine La.  2.  I will provide a \"Dear Relative\" letter for Bernadine aL to share with her family members.  3.  She plans to follow up with her other providers.    If Bernadine La has additional questions, I encouraged her to contact me directly at [phone].     Time spent on video: 10 minutes    Jean-Paul Hess MS, [facility]  Licensed, Certified Genetic Counselor  (P): [phone]  (F): [phone]   "

## 2021-11-23 NOTE — PROGRESS NOTES
"11/5/2021    Referring Provider: Noa Gardner MD    Presenting Information:   I spoke to Bernadine La by video today to discuss her genetic testing results. A saliva kit was sent to her house on 9/14/21. A custom panel was ordered from [employer]. This testing was done because of Bernadine La's family history of breast, ovarian, prostate, and kidney cancer.     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 We discussed that this mutation is associated with an increased risk for prostate cancer. We discussed the impact of this testing on Bernadine La in detail.     Of note, Bernadine La is negative for mutations in APC, RAMBO, AXIN2, BAP1, BARD1, BMPR1A, BRCA1, BRCA2, BRIP1, CDC73, CDH1, CDK4, CDKN2A, CDKN1C, CHEK2, CTNNA1, DICER1, DIS3L2, EPCAM, FH, FLCN, GREM1, GPC3, KIT, MEN1, MET, MLH1, MSH2, MSH3, MSH6, MUTYH, NBN, NF1, NTHL1, PALB2, PDGFRA, PMS2, POLD1, POLE, PTEN, RAD50, RAD51C, RAD51D, REST, SDHA, SDHB, SDHC, SDHD, SMAD4, SMARCA4, SMARCB1, STK11, TP53, TSC1, TSC2, VHL, and WT1. No mutations were found in any of the other 57 genes analyzed. This test involved sequencing and deletion/duplication analysis of all genes with the exceptions of EPCAM and GREM1 (deletions/duplications only) and SDHA (sequencing only). We reviewed the autosomal dominant inheritance of these genes. Bernadine La cannot pass on a mutation in any of these genes to her children based on this test result. Mutations in these genes do not skip generations.      A copy of the test report can be found in the Laboratory tab, dated 10/4/21, and named \"LABORATORY MISCELLANEOUS ORDER\". The report is scanned in as a linked document.    Cancer Risks:  We reviewed that mutations in the HOXB13 gene are associated with increased prostate cancer risk. The p.G84E mutation in HOXB13 has been reported as a  mutation in [place] and other [place] " (Yina CANTRELL et al, Eur Urol, 2014, 65:169-176).   ? Studies suggest the risk of prostate cancer with this particular mutation, p.G84E, to be as high as 3-4 times the population risk.    ? Of note, a recent study by Augusta MOREAU et al (Eur Urol, 2018) suggests that the lifetime prostate cancer risk may be influenced by the family history. This means individuals with a significant family history of prostate cancer may have a higher lifetime prostate cancer risk as compared to those with no family history of prostate cancer. Per this study, the average lifetime prostate cancer risk is estimated to be 62% for men with a HOXB13 mutation and no significant family history of prostate cancer. More studies are needed, though, to clarify this risk.  ? Studies also suggest that the prostate cancers associated with HOXB13 mutations are diagnosed at a younger age, but are not necessarily more aggressive than general population prostate cancers. Additional studies are needed, though, to confirm these findings as well.  ? Other cancer risks associated with a HOXB13 mutation, particularly female cancer risks, are unknown at this time.      Cancer Screening and Prevention:  There are currently no published prostate cancer screening guidelines specific to men who carry a HOXB13 mutation.   ? The American Cancer Society currently recommends that individuals at high risk for prostate cancer discuss prostate cancer screening with a physician. Since the risk of prostate cancer is increased with a HOXB13 mutation, screening could be considered as early as age 40-45. This may vary based on family history, though.  ? Screening typically involves a PSA blood test and can also include a digital rectal exam (GAIL). Final screening recommendations should be made by each individual's physician.    Other screening based on Bernadine La's personal and family history:    Based on her personal and family history, Bernadine La has a 15.8% lifetime risk of  developing breast cancer based on the NAT model. Therefore, Bernadine La does not meet current National Comprehensive Cancer Network (NCCN) guidelines for high risk breast screening, which is offered to women with a 20% lifetime risk or higher. However, it is still important for Bernadine La to continue with routine breast screening under the care of her physicians. Breast cancer screening is generally recommended to begin approximately 10 years younger than the earliest age of breast cancer diagnosis in the family, or at age 40, whichever comes first. In this family, screening may begin at age 40. Bernadine La is encouraged to discuss breast screening with her physicians.     Due to Bernadine La's family history of ovarian cancer, Bernadine La and other close female relatives may remain at slightly increased risk for ovarian cancer. We discussed that this risk is dependent on her sister's pathology. If her sister's ovarian cancer was a germ cell tumor or a teratoma, Bernadine La is likely not at increased risk for ovarian cancer. If her sister's ovarian cancer was epithelial cancer, then Bernadine aL may be at increased risk. We discussed available ovarian cancer screening (pelvic exams, CA-125 blood tests, and transvaginal ultrasounds) as well as the significant limitations of this screening. As such, this screening is not typically recommended. That being said, women in this family should discuss this screening and the signs and symptoms of ovarian cancer with their primary OB/GYN provider, as they may have individualized recommendations.    Other population cancer screening options, such as those recommended by the American Cancer Society and the National Comprehensive Cancer Network (NCCN), are also appropriate for Bernadine La and her family. These screening recommendations may change if there are changes to Bernadine La's personal and/or family history of cancer. Final screening recommendations should be made by each  "individual's primary care provider.    Of note, the above information is based on our current understanding of Bernadine La's genetic findings. Bernadine La is encouraged to reach out to me regularly regarding any pertinent updates to her personal and/or family history of cancer, as our understanding of the genetic findings in her family may change over time.     Implications for Family Members:  We reviewed that mutations in the HOSB13 gene are inherited in an autosomal dominant pattern. This means that each of Bernadine La's children have a 50% chance of inheriting the same mutation. Likewise, each of her siblings has a 50% risk of having the same mutation. Extended relatives may also carry this mutation, and she is encouraged to share this information with her family members on both sides of the family. I am happy to help her relatives connect with a genetic counselor in their area if they would like to discuss testing.    Additional Testing Considerations:  Even though Bernadine La's genetic testing result was positive, other relatives may carry a different gene mutation associated with breast, ovarian, kidney, and/or prostate cancer. Genetic counseling is recommended for other close relative, especially those with cancer to discuss genetic testing options. If any of these relatives do pursue genetic testing, Bernadine La is encouraged to contact me so that we may review the impact of their test results on her.    Plan:  1.  A copy of the test results will be mailed to Bernadine La.  2.  I will provide a \"Dear Relative\" letter for Bernadine La to share with her family members.  3.  She plans to follow up with her other providers.    If Bernadine La has additional questions, I encouraged her to contact me directly at [phone].     Time spent on video: 10 minutes    Jean-Paul Hess MS, [facility]  Licensed, Certified Genetic Counselor  (P): [phone]  (F): [phone]   "

## 2021-11-24 NOTE — PATIENT INSTRUCTIONS
11/23/2021    Dear Relative,     The purpose of this letter is to inform you that your relative recently underwent genetic counseling and genetic testing due to the family history of breast, ovarian, kidney, and prostate cancer. The testing done through [facility] identified a mutation in the HOXB13 gene. Specifically, the mutation is called c.251G>A (p.G84E). The requisition number linked to your relative's test report is [id number].    A mutation (or change in the genetic code) causes a specific gene to stop working properly. Ultimately, men who have a mutation in this HOXB13 gene have an increased risk for prostate cancer.  The medical literature reports the risk of prostate cancer with this mutation, p.G84E, to be as high as 3-4 times population risk.    Currently, there are no published prostate cancer screening guidelines for men with this mutation. The American Cancer Society current recommendations are to discuss prostate cancer screening with a physician.  If individuals are found to have a mutation in the HOXB13 gene, we would review available prostate cancer screening options, and recommend that men discuss screening in more detail with their doctor.     It is important to note that both men and women have a 50% chance of passing this mutation on to each of their children.  Although cancer risks for women have not been described at this time for the HOXB13 gene, genetic testing for this mutation women could help clarify risk for their sons.    I understand that this may be surprising, unexpected, and even scary news. Because this mutation has been identified in your family, you are at risk for having the same mutation. As mentioned earlier, your children may also be at risk.     Scheduling a genetic counseling appointment does not mean you have to undergo genetic testing. The decision to pursue such testing is a very personal one that is discussed in more detail during the session. Much of cancer genetic  counseling is providing valuable information to individuals who are impacted by genetic information such as this.     If you are interested in scheduling a genetic counseling appointment at [facility], please call [phone] to schedule an appointment. You can also find a genetic counselor close to you or at another health system at [facility]    Sincerely,     Jean-Paul Hess MS, [facility]  Licensed, Certified Genetic Counselor  (P): [phone]  (F): [phone]

## 2022-09-03 ENCOUNTER — HEALTH MAINTENANCE LETTER (OUTPATIENT)
Age: 42
End: 2022-09-03

## 2023-01-15 ENCOUNTER — HEALTH MAINTENANCE LETTER (OUTPATIENT)
Age: 43
End: 2023-01-15

## 2024-02-17 ENCOUNTER — HEALTH MAINTENANCE LETTER (OUTPATIENT)
Age: 44
End: 2024-02-17